# Patient Record
Sex: MALE | Race: WHITE | NOT HISPANIC OR LATINO | Employment: OTHER | URBAN - METROPOLITAN AREA
[De-identification: names, ages, dates, MRNs, and addresses within clinical notes are randomized per-mention and may not be internally consistent; named-entity substitution may affect disease eponyms.]

---

## 2017-12-04 ENCOUNTER — GENERIC CONVERSION - ENCOUNTER (OUTPATIENT)
Dept: OTHER | Facility: OTHER | Age: 42
End: 2017-12-04

## 2017-12-04 DIAGNOSIS — R03.0 ELEVATED BLOOD PRESSURE READING WITHOUT DIAGNOSIS OF HYPERTENSION: ICD-10-CM

## 2017-12-04 DIAGNOSIS — Z13.6 ENCOUNTER FOR SCREENING FOR CARDIOVASCULAR DISORDERS: ICD-10-CM

## 2017-12-04 DIAGNOSIS — R19.5 OTHER FECAL ABNORMALITIES: ICD-10-CM

## 2017-12-04 DIAGNOSIS — M25.521 PAIN IN RIGHT ELBOW: ICD-10-CM

## 2018-01-24 VITALS
HEIGHT: 78 IN | WEIGHT: 191 LBS | SYSTOLIC BLOOD PRESSURE: 148 MMHG | OXYGEN SATURATION: 97 % | BODY MASS INDEX: 22.1 KG/M2 | HEART RATE: 72 BPM | DIASTOLIC BLOOD PRESSURE: 86 MMHG

## 2018-01-24 VITALS — DIASTOLIC BLOOD PRESSURE: 90 MMHG | SYSTOLIC BLOOD PRESSURE: 146 MMHG

## 2019-10-14 ENCOUNTER — OFFICE VISIT (OUTPATIENT)
Dept: FAMILY MEDICINE CLINIC | Facility: CLINIC | Age: 44
End: 2019-10-14
Payer: COMMERCIAL

## 2019-10-14 VITALS
WEIGHT: 195 LBS | HEART RATE: 70 BPM | HEIGHT: 70 IN | SYSTOLIC BLOOD PRESSURE: 126 MMHG | OXYGEN SATURATION: 97 % | DIASTOLIC BLOOD PRESSURE: 82 MMHG | BODY MASS INDEX: 27.92 KG/M2

## 2019-10-14 DIAGNOSIS — Z00.00 WELL ADULT EXAM: Primary | ICD-10-CM

## 2019-10-14 PROCEDURE — 99396 PREV VISIT EST AGE 40-64: CPT | Performed by: FAMILY MEDICINE

## 2019-10-14 NOTE — PROGRESS NOTES
BMI Counseling: Body mass index is 28 3 kg/m²  The BMI is above normal  Nutrition recommendations include decreasing portion sizes, decreasing fast food intake, limiting drinks that contain sugar, moderation in carbohydrate intake and reducing intake of saturated and trans fat  Exercise recommendations include moderate physical activity 150 minutes/week  Assessment/Plan:     Chronic Problems:  No problem-specific Assessment & Plan notes found for this encounter  Visit Diagnosis:  Diagnoses and all orders for this visit:    Well adult exam  -     Comprehensive metabolic panel; Future  -     Lipid panel; Future  -     Urinalysis with reflex to microscopic; Future    form not completed, labs rx for updated lipid profile,   Reviewed age appropriate anticaporty guidance   Immunizations completed spouse employment        Subjective:    Patient ID: Carlos Alvarez is a 38 y o  male  wellness exam wife work   Has not been to provider in 2 yrs no labs   Generally well   wife works for [employer] employed [employer]     meds   rx -  Supplements -melatonin  Herbal     Soc hx      Smoke - vape -   etoh +  Drug-    fhx   father passed 48 heart disease  Mother         The following portions of the patient's history were reviewed and updated as appropriate: allergies, current medications, past family history, past medical history, past social history, past surgical history and problem list     Review of Systems   Constitutional: Negative for appetite change, chills, fever and unexpected weight change  HENT: Negative for congestion, dental problem, ear pain, hearing loss, postnasal drip, rhinorrhea, sinus pressure, sinus pain, sneezing, sore throat, tinnitus and voice change  Eyes: Negative for visual disturbance  Respiratory: Negative for apnea, cough, chest tightness and shortness of breath  Cardiovascular: Negative for chest pain, palpitations and leg swelling     Gastrointestinal: Negative for abdominal pain, blood in stool, constipation, diarrhea, nausea and vomiting  Endocrine: Negative for cold intolerance, heat intolerance, polydipsia, polyphagia and polyuria  Genitourinary: Negative for decreased urine volume, difficulty urinating, dysuria, frequency and hematuria  Musculoskeletal: Negative for arthralgias, back pain, gait problem, joint swelling and myalgias  Skin: Negative for color change, rash and wound  Allergic/Immunologic: Negative for environmental allergies and food allergies  Neurological: Negative for dizziness, syncope, weakness, light-headedness, numbness and headaches  Hematological: Negative for adenopathy  Does not bruise/bleed easily  Psychiatric/Behavioral: Negative for sleep disturbance and suicidal ideas  The patient is not nervous/anxious            /82   Pulse 70   Ht 5' 9 6" (1 768 m)   Wt 88 5 kg (195 lb)   SpO2 97%   BMI 28 30 kg/m²   Social History     Socioeconomic History    Marital status: /Civil Union     Spouse name: Not on file    Number of children: Not on file    Years of education: Not on file    Highest education level: Not on file   Occupational History    Not on file   Social Needs    Financial resource strain: Not on file    Food insecurity:     Worry: Not on file     Inability: Not on file    Transportation needs:     Medical: Not on file     Non-medical: Not on file   Tobacco Use    Smoking status: Not on file   Substance and Sexual Activity    Alcohol use: Not on file    Drug use: Not on file    Sexual activity: Not on file   Lifestyle    Physical activity:     Days per week: Not on file     Minutes per session: Not on file    Stress: Not on file   Relationships    Social connections:     Talks on phone: Not on file     Gets together: Not on file     Attends [employer] service: Not on file     Active member of club or organization: Not on file     Attends meetings of clubs or organizations: Not on file Relationship status: Not on file    Intimate partner violence:     Fear of current or ex partner: Not on file     Emotionally abused: Not on file     Physically abused: Not on file     Forced sexual activity: Not on file   Other Topics Concern    Not on file   Social History Narrative    Not on file     No past medical history on file  No family history on file  No past surgical history on file  No current outpatient medications on file  Allergies   Allergen Reactions    Penicillins           Lab Review   No visits with results within 6 Month(s) from this visit  Latest known visit with results is:   No results found for any previous visit  Imaging: No results found  Objective:     Physical Exam   Constitutional: He is oriented to person, place, and time  He appears well-developed and well-nourished  No distress  HENT:   Head: Normocephalic and atraumatic  Right Ear: External ear normal    Left Ear: External ear normal    Eyes: Conjunctivae are normal  No scleral icterus  Neck: Normal range of motion  Neck supple  Cardiovascular: Normal rate, regular rhythm and normal heart sounds  Pulses:       Carotid pulses are 3+ on the right side, and 3+ on the left side  Pulmonary/Chest: Effort normal and breath sounds normal    Musculoskeletal: Normal range of motion  He exhibits no edema or tenderness  Lymphadenopathy:     He has no cervical adenopathy  Neurological: He is alert and oriented to person, place, and time  He has normal reflexes  Skin: Skin is warm and dry  Psychiatric: He has a normal mood and affect  His behavior is normal  Judgment and thought content normal          There are no Patient Instructions on file for this visit  [place] So, CRNP    Portions of the record may have been created with voice recognition software  Occasional wrong word or "sound a like" substitutions may have occurred due to the inherent limitations of voice recognition software    Read the chart carefully and recognize, using context, where substitutions have occurred

## 2019-10-17 ENCOUNTER — APPOINTMENT (OUTPATIENT)
Dept: LAB | Facility: HOSPITAL | Age: 44
End: 2019-10-17
Payer: COMMERCIAL

## 2019-10-17 DIAGNOSIS — Z00.00 WELL ADULT EXAM: ICD-10-CM

## 2019-10-17 LAB
ALBUMIN SERPL BCP-MCNC: 3.9 G/DL (ref 3.5–5)
ALP SERPL-CCNC: 47 U/L (ref 46–116)
ALT SERPL W P-5'-P-CCNC: 33 U/L (ref 12–78)
ANION GAP SERPL CALCULATED.3IONS-SCNC: 9 MMOL/L (ref 4–13)
AST SERPL W P-5'-P-CCNC: 25 U/L (ref 5–45)
BILIRUB SERPL-MCNC: 0.6 MG/DL (ref 0.2–1)
BILIRUB UR QL STRIP: NEGATIVE
BUN SERPL-MCNC: 19 MG/DL (ref 5–25)
CALCIUM SERPL-MCNC: 9 MG/DL (ref 8.3–10.1)
CHLORIDE SERPL-SCNC: 103 MMOL/L (ref 100–108)
CHOLEST SERPL-MCNC: 217 MG/DL (ref 50–200)
CLARITY UR: CLEAR
CO2 SERPL-SCNC: 29 MMOL/L (ref 21–32)
COLOR UR: YELLOW
CREAT SERPL-MCNC: 0.91 MG/DL (ref 0.6–1.3)
GFR SERPL CREATININE-BSD FRML MDRD: 103 ML/MIN/1.73SQ M
GLUCOSE P FAST SERPL-MCNC: 93 MG/DL (ref 65–99)
GLUCOSE UR STRIP-MCNC: NEGATIVE MG/DL
HDLC SERPL-MCNC: 58 MG/DL (ref 40–60)
HGB UR QL STRIP.AUTO: NEGATIVE
KETONES UR STRIP-MCNC: NEGATIVE MG/DL
LDLC SERPL CALC-MCNC: 149 MG/DL (ref 0–100)
LEUKOCYTE ESTERASE UR QL STRIP: NEGATIVE
NITRITE UR QL STRIP: NEGATIVE
NONHDLC SERPL-MCNC: 159 MG/DL
PH UR STRIP.AUTO: 6.5 [PH]
POTASSIUM SERPL-SCNC: 4.2 MMOL/L (ref 3.5–5.3)
PROT SERPL-MCNC: 7 G/DL (ref 6.4–8.2)
PROT UR STRIP-MCNC: NEGATIVE MG/DL
SODIUM SERPL-SCNC: 141 MMOL/L (ref 136–145)
SP GR UR STRIP.AUTO: 1.02 (ref 1–1.03)
TRIGL SERPL-MCNC: 51 MG/DL
UROBILINOGEN UR QL STRIP.AUTO: 0.2 E.U./DL

## 2019-10-17 PROCEDURE — 80053 COMPREHEN METABOLIC PANEL: CPT

## 2019-10-17 PROCEDURE — 36415 COLL VENOUS BLD VENIPUNCTURE: CPT

## 2019-10-17 PROCEDURE — 80061 LIPID PANEL: CPT

## 2019-10-17 PROCEDURE — 81003 URINALYSIS AUTO W/O SCOPE: CPT

## 2019-10-23 ENCOUNTER — OFFICE VISIT (OUTPATIENT)
Dept: FAMILY MEDICINE CLINIC | Facility: CLINIC | Age: 44
End: 2019-10-23
Payer: COMMERCIAL

## 2019-10-23 VITALS
HEART RATE: 72 BPM | DIASTOLIC BLOOD PRESSURE: 88 MMHG | TEMPERATURE: 98.2 F | HEIGHT: 70 IN | OXYGEN SATURATION: 97 % | SYSTOLIC BLOOD PRESSURE: 130 MMHG | WEIGHT: 197 LBS | BODY MASS INDEX: 28.2 KG/M2

## 2019-10-23 DIAGNOSIS — Z00.00 WELL ADULT EXAM: Primary | ICD-10-CM

## 2019-10-23 DIAGNOSIS — G47.30 SLEEP APNEA, UNSPECIFIED TYPE: ICD-10-CM

## 2019-10-23 DIAGNOSIS — R19.7 DIARRHEA, UNSPECIFIED TYPE: ICD-10-CM

## 2019-10-23 DIAGNOSIS — G89.29 ELBOW PAIN, CHRONIC, RIGHT: ICD-10-CM

## 2019-10-23 DIAGNOSIS — M25.521 ELBOW PAIN, CHRONIC, RIGHT: ICD-10-CM

## 2019-10-23 PROBLEM — Z00.129 WELL CHILD VISIT: Status: ACTIVE | Noted: 2019-10-23

## 2019-10-23 PROCEDURE — 99214 OFFICE O/P EST MOD 30 MIN: CPT | Performed by: NURSE PRACTITIONER

## 2019-10-23 NOTE — ASSESSMENT & PLAN NOTE
Reports of snoring and gasping for breath  Also reports feeling very fatigued during the day  Sleep study ordered

## 2019-10-23 NOTE — PATIENT INSTRUCTIONS
Get sleep study done  Follow up with orthopedics  Follow with gastroenterology  Use nasal spray       Heart Healthy Diet   WHAT YOU NEED TO KNOW:   A heart healthy diet is an eating plan low in total fat, unhealthy fats, and sodium (salt)  A heart healthy diet helps decrease your risk for heart disease and stroke  Limit the amount of fat you eat to 25% to 35% of your total daily calories  Limit sodium to less than 2,300 mg each day  DISCHARGE INSTRUCTIONS:   Healthy fats:  Healthy fats can help improve cholesterol levels  The risk for heart disease is decreased when cholesterol levels are normal  Choose healthy fats, such as the following:  · Unsaturated fat  is found in foods such as soybean, canola, olive, corn, and safflower oils  It is also found in soft tub margarine that is made with liquid vegetable oil  · Omega-3 fat  is found in certain fish, such as salmon, tuna, and trout, and in walnuts and flaxseed  Unhealthy fats:  Unhealthy fats can cause unhealthy cholesterol levels in your blood and increase your risk of heart disease  Limit unhealthy fats, such as the following:  · Cholesterol  is found in animal foods, such as eggs and lobster, and in dairy products made from whole milk  Limit cholesterol to less than 300 milligrams (mg) each day  You may need to limit cholesterol to 200 mg each day if you have heart disease  · Saturated fat  is found in meats, such as whitlock and hamburger  It is also found in chicken or turkey skin, whole milk, and butter  Limit saturated fat to less than 7% of your total daily calories  Limit saturated fat to less than 6% if you have heart disease or are at increased risk for it  · Trans fat  is found in packaged foods, such as potato chips and cookies  It is also in hard margarine, some fried foods, and shortening  Avoid trans fats as much as possible    Heart healthy foods and drinks to include:  Ask your dietitian or healthcare provider how many servings to have from each of the following food groups:  · Grains:      ¨ Whole-wheat breads, cereals, and pastas, and brown rice    ¨ Low-fat, low-sodium crackers and chips    · Vegetables:      ¨ Broccoli, green beans, green peas, and spinach    ¨ Collards, kale, and lima beans    ¨ Carrots, sweet potatoes, tomatoes, and peppers    ¨ Canned vegetables with no salt added    · Fruits:      ¨ Bananas, peaches, pears, and pineapple    ¨ Grapes, raisins, and dates    ¨ Oranges, tangerines, grapefruit, orange juice, and grapefruit juice    ¨ Apricots, mangoes, melons, and papaya    ¨ Raspberries and strawberries    ¨ Canned fruit with no added sugar    · Low-fat dairy products:      ¨ Nonfat (skim) milk, 1% milk, and low-fat almond, cashew, or soy milks fortified with calcium    ¨ Low-fat cheese, regular or frozen yogurt, and cottage cheese    · Meats and proteins , such as lean cuts of beef and pork (loin, leg, round), skinless chicken and turkey, legumes, soy products, egg whites, and nuts  Foods and drinks to limit or avoid:  Ask your dietitian or healthcare provider about these and other foods that are high in unhealthy fat, sodium, and sugar:  · Snack or packaged foods , such as frozen dinners, cookies, macaroni and cheese, and cereals with more than 300 mg of sodium per serving    · Canned or dry mixes  for cakes, soups, sauces, or gravies    · Vegetables with added sodium , such as instant potatoes, vegetables with added sauces, or regular canned vegetables    · Other foods high in sodium , such as ketchup, barbecue sauce, salad dressing, pickles, olives, soy sauce, and miso    · High-fat dairy foods  such as whole or 2% milk, cream cheese, or sour cream, and cheeses     · High-fat protein foods  such as high-fat cuts of beef (T-bone steaks, ribs), chicken or turkey with skin, and organ meats, such as liver    · Cured or smoked meats , such as hot dogs, whitlock, and sausage    · Unhealthy fats and oils , such as butter, stick margarine, shortening, and cooking oils such as coconut or palm oil    · Food and drinks high in sugar , such as soft drinks (soda), sports drinks, sweetened tea, candy, cake, cookies, pies, and doughnuts  Other diet guidelines to follow:   · Eat more foods containing omega-3 fats  Eat fish high in omega-3 fats at least 2 times a week  · Limit alcohol  Too much alcohol can damage your heart and raise your blood pressure  Women should limit alcohol to 1 drink a day  Men should limit alcohol to 2 drinks a day  A drink of alcohol is 12 ounces of beer, 5 ounces of wine, or 1½ ounces of liquor  · Choose low-sodium foods  High-sodium foods can lead to high blood pressure  Add little or no salt to food you prepare  Use herbs and spices in place of salt  · Eat more fiber  to help lower cholesterol levels  Eat at least 5 servings of fruits and vegetables each day  Eat 3 ounces of whole-grain foods each day  Legumes (beans) are also a good source of fiber  Lifestyle guidelines:   · Do not smoke  Nicotine and other chemicals in cigarettes and cigars can cause lung and heart damage  Ask your healthcare provider for information if you currently smoke and need help to quit  E-cigarettes or smokeless tobacco still contain nicotine  Talk to your healthcare provider before you use these products  · Exercise regularly  to help you maintain a healthy weight and improve your blood pressure and cholesterol levels  Ask your healthcare provider about the best exercise plan for you  Do not start an exercise program without asking your healthcare provider  Follow up with your healthcare provider as directed:  Write down your questions so you remember to ask them during your visits  © 2017 [employer] Information is for End User's use only and may not be sold, redistributed or otherwise used for commercial purposes   All illustrations and images included in CareNotes® are the copyrighted property of [employer]  or [employer]  The above information is an  only  It is not intended as medical advice for individual conditions or treatments  Talk to your doctor, nurse or pharmacist before following any medical regimen to see if it is safe and effective for you

## 2019-10-23 NOTE — ASSESSMENT & PLAN NOTE
Has a history of injuring the right elbow  States that there were pieces day  Would like to have removed  Orthopedic referral made

## 2019-10-23 NOTE — PROGRESS NOTES
Assessment/Plan:     Well adult exam  Reviewed labs with patient  Encouraged heart healthy diet  Sleep apnea  Reports of snoring and gasping for breath  Also reports feeling very fatigued during the day  Sleep study ordered  Diarrhea  States that he has had IBS  Was never diagnosed  Referral made to Gastroenterology  Elbow pain, chronic, right  Has a history of injuring the right elbow  States that there were pieces day  Would like to have removed  Orthopedic referral made  Problem List Items Addressed This Visit        Respiratory    Sleep apnea     Reports of snoring and gasping for breath  Also reports feeling very fatigued during the day  Sleep study ordered  Relevant Orders    Home Study       Other    Diarrhea     States that he has had IBS  Was never diagnosed  Referral made to Gastroenterology  Relevant Orders    Ambulatory referral to Gastroenterology    Elbow pain, chronic, right     Has a history of injuring the right elbow  States that there were pieces day  Would like to have removed  Orthopedic referral made  Relevant Orders    Ambulatory referral to Orthopedic Surgery    Well adult exam - Primary     Reviewed labs with patient  Encouraged heart healthy diet  Subjective:      Patient ID: Ruth Benton is a 38 y o  male  Patient is here to have insurance forms completed from [facility]  Patient also has concerns of frequent diarrhea episodes  Patient also has a right elbow injury that occurred years ago  States that he has bone pieces in there  Would like to have it looked at and removed  Is causing numbing and tingling in his right arm  Patient also reports that he is waking up in the middle of the night, reports that his wife states that he is snoring and he is gasping for breath        The following portions of the patient's history were reviewed and updated as appropriate: allergies, current medications, past family history, past medical history, past social history, past surgical history and problem list     Review of Systems   Constitutional: Negative  HENT: Negative  Eyes: Negative  Respiratory: Negative  Cardiovascular: Positive for palpitations (Caffeine)  Gastrointestinal: Positive for abdominal pain and diarrhea (soft stool)  Endocrine: Negative  Genitourinary: Negative  Musculoskeletal: Positive for arthralgias  Skin: Negative  Allergic/Immunologic: Negative  Neurological: Negative  Hematological: Negative  Psychiatric/Behavioral: Positive for sleep disturbance (Sleeping, breathing  Gasping)  Negative for hallucinations  Objective:      /88   Pulse 72   Temp 98 2 °F (36 8 °C) (Tympanic)   Ht 5' 9 6" (1 768 m)   Wt 89 4 kg (197 lb)   SpO2 97%   BMI 28 59 kg/m²          Physical Exam   Constitutional: He is oriented to person, place, and time  He appears well-developed and well-nourished  HENT:   Head: Normocephalic and atraumatic  Eyes: Pupils are equal, round, and reactive to light  Neck: Normal range of motion  Cardiovascular: Normal rate and regular rhythm  Pulmonary/Chest: Effort normal and breath sounds normal    Musculoskeletal: Normal range of motion  Neurological: He is oriented to person, place, and time  Skin: Skin is warm and dry  Psychiatric: He has a normal mood and affect  His behavior is normal  Judgment and thought content normal    Nursing note and vitals reviewed          Labs:    No results found for: WBC, HGB, HCT, MCV, PLT  Lab Results   Component Value Date    K 4 2 10/17/2019     10/17/2019    CO2 29 10/17/2019    BUN 19 10/17/2019    CREATININE 0 91 10/17/2019    GLUF 93 10/17/2019    CALCIUM 9 0 10/17/2019    AST 25 10/17/2019    ALT 33 10/17/2019    ALKPHOS 47 10/17/2019    EGFR 103 10/17/2019     Lab Results   Component Value Date    CALCIUM 9 0 10/17/2019    K 4 2 10/17/2019    CO2 29 10/17/2019     10/17/2019 BUN 19 10/17/2019    CREATININE 0 91 10/17/2019

## 2019-11-01 ENCOUNTER — OFFICE VISIT (OUTPATIENT)
Dept: GASTROENTEROLOGY | Facility: CLINIC | Age: 44
End: 2019-11-01
Payer: COMMERCIAL

## 2019-11-01 VITALS
SYSTOLIC BLOOD PRESSURE: 146 MMHG | WEIGHT: 198.25 LBS | DIASTOLIC BLOOD PRESSURE: 82 MMHG | BODY MASS INDEX: 28.77 KG/M2 | HEART RATE: 81 BPM

## 2019-11-01 DIAGNOSIS — K59.1 FUNCTIONAL DIARRHEA: Primary | ICD-10-CM

## 2019-11-01 DIAGNOSIS — K58.0 IRRITABLE BOWEL SYNDROME WITH DIARRHEA: ICD-10-CM

## 2019-11-01 PROCEDURE — 99244 OFF/OP CNSLTJ NEW/EST MOD 40: CPT | Performed by: INTERNAL MEDICINE

## 2019-11-01 RX ORDER — DICYCLOMINE HCL 20 MG
20 TABLET ORAL 3 TIMES DAILY PRN
Qty: 60 TABLET | Refills: 0 | Status: SHIPPED | OUTPATIENT
Start: 2019-11-01 | End: 2019-12-12 | Stop reason: SDUPTHER

## 2019-11-01 NOTE — PROGRESS NOTES
Consultation - [facility]  Ned Alert 1975 38 y o  male     ASSESSMENT @ PLAN:   He is a 60-year-old male with 10 years of progressive symptoms compatible with the irritable bowel syndrome with episodes of bilateral lower quadrant abdominal pain bloating diarrhea urgency and frequency with specific food triggers of high fat low-fiber  His mother  of colon cancer    1 will do endoscopy and colonoscopy to investigate    2 I told him to do benefit fiber every day and align probiotic every day    3 will give dicyclomine for relief of abdominal pain    4 will give Xifaxan course to take after the procedures    Chief Complaint:   Abdominal pain and diarrhea    HPI:   He is a 60-year-old male with 10 years of abdominal symptoms that have been stable for about 5 years  It has been progressive  He thinks he has irritable bowel syndrome  He has bouts where he has pain and diarrhea  It is random in intermittent  He does have triggers of high fat foods and low fiber foods  He reports he will soft stool then on episodes of days where symptoms he has bilateral lower quadrant abdominal pain cramping with urgency and frequency and poorly formed stool in loose stool it is low volume it is fragmented is brown there is no melena hematochezia  The pain is relieved with a bowel movement  He does not have abdominal distension he does report bloating  He denies heartburn dysphagia regurgitation burping belching he denies upper abdominal pain  He takes no NSAIDs  Nobody in the family has celiac disease Crohn's disease or ulcerative colitis  His mother did have colon cancer and  from that  He has never had a colonoscopy  REVIEW OF SYSTEMS:     CONSTITUTIONAL: Denies any fever, chills, or rigors  Good appetite, and no recent weight loss  HEENT: No earache or tinnitus  Denies hearing loss or visual disturbances  CARDIOVASCULAR: No chest pain or palpitations     RESPIRATORY: Denies any cough, hemoptysis, shortness of breath or dyspnea on exertion  GASTROINTESTINAL: As noted in the History of Present Illness  GENITOURINARY: No problems with urination  Denies any hematuria or dysuria  NEUROLOGIC: No dizziness or vertigo, denies headaches  MUSCULOSKELETAL: Denies any muscle or joint pain  SKIN: Denies skin rashes or itching  ENDOCRINE: Denies excessive thirst  Denies intolerance to heat or cold  PSYCHOSOCIAL: Denies depression or anxiety  Denies any recent memory loss  History reviewed  No pertinent past medical history     Past Surgical History:   Procedure Laterality Date    WISDOM TOOTH EXTRACTION       Social History     Socioeconomic History    Marital status: /Civil Union     Spouse name: Not on file    Number of children: Not on file    Years of education: Not on file    Highest education level: Not on file   Occupational History    Not on file   Social Needs    Financial resource strain: Not on file    Food insecurity:     Worry: Not on file     Inability: Not on file    Transportation needs:     Medical: Not on file     Non-medical: Not on file   Tobacco Use    Smoking status: Never Smoker    Smokeless tobacco: Never Used   Substance and Sexual Activity    Alcohol use: Yes     Frequency: 2-3 times a week    Drug use: Never    Sexual activity: Not on file   Lifestyle    Physical activity:     Days per week: Not on file     Minutes per session: Not on file    Stress: Not on file   Relationships    Social connections:     Talks on phone: Not on file     Gets together: Not on file     Attends Church service: Not on file     Active member of club or organization: Not on file     Attends meetings of clubs or organizations: Not on file     Relationship status: Not on file    Intimate partner violence:     Fear of current or ex partner: Not on file     Emotionally abused: Not on file     Physically abused: Not on file     Forced sexual activity: Not on file Other Topics Concern    Not on file   Social History Narrative    Not on file     Family History   Problem Relation Age of Onset    Cancer Mother     Colon cancer Mother     Heart disease Father      Penicillins  Current Outpatient Medications   Medication Sig Dispense Refill    patient supplied medication       dicyclomine (BENTYL) 20 mg tablet Take 1 tablet (20 mg total) by mouth 3 (three) times a day as needed (abdominal pain) 60 tablet 0    rifaximin (XIFAXAN) 550 mg tablet Take 1 tablet (550 mg total) by mouth every 8 (eight) hours for 14 days 42 tablet 0     No current facility-administered medications for this visit  Blood pressure 146/82, pulse 81, weight 89 9 kg (198 lb 4 oz)  PHYSICAL EXAM:     General Appearance:   Alert, cooperative, no distress, appears stated age    HEENT:   Normocephalic, atraumatic, anicteric      Neck:  Supple, symmetrical, trachea midline, no adenopathy;    thyroid: no enlargement/tenderness/nodules; no carotid  bruit or JVD    Lungs:   Clear to auscultation bilaterally; no rales, rhonchi or wheezing; respirations unlabored    Heart[de-identified]   S1 and S2 normal; regular rate and rhythm; no murmur, rub, or gallop     Abdomen:   Soft, non-tender, non-distended; normal bowel sounds; no masses, no organomegaly    Genitalia:   Deferred    Rectal:   Deferred    Extremities:  No cyanosis, clubbing or edema    Pulses:  2+ and symmetric all extremities    Skin:  Skin color, texture, turgor normal, no rashes or lesions    Lymph nodes:  No palpable cervical, axillary or inguinal lymphadenopathy        No results found for: WBC, HGB, HCT, MCV, PLT  Lab Results   Component Value Date    CALCIUM 9 0 10/17/2019    K 4 2 10/17/2019    CO2 29 10/17/2019     10/17/2019    BUN 19 10/17/2019    CREATININE 0 91 10/17/2019     Lab Results   Component Value Date    ALT 33 10/17/2019    AST 25 10/17/2019    ALKPHOS 47 10/17/2019     No results found for: INR, PROTIME

## 2019-11-05 ENCOUNTER — TELEPHONE (OUTPATIENT)
Dept: GASTROENTEROLOGY | Facility: CLINIC | Age: 44
End: 2019-11-05

## 2019-11-06 ENCOUNTER — APPOINTMENT (OUTPATIENT)
Dept: RADIOLOGY | Facility: CLINIC | Age: 44
End: 2019-11-06
Payer: COMMERCIAL

## 2019-11-06 ENCOUNTER — OFFICE VISIT (OUTPATIENT)
Dept: OBGYN CLINIC | Facility: CLINIC | Age: 44
End: 2019-11-06
Payer: COMMERCIAL

## 2019-11-06 ENCOUNTER — TELEPHONE (OUTPATIENT)
Dept: SLEEP CENTER | Facility: CLINIC | Age: 44
End: 2019-11-06

## 2019-11-06 VITALS
BODY MASS INDEX: 29.38 KG/M2 | HEART RATE: 74 BPM | WEIGHT: 198.4 LBS | HEIGHT: 69 IN | SYSTOLIC BLOOD PRESSURE: 137 MMHG | DIASTOLIC BLOOD PRESSURE: 79 MMHG

## 2019-11-06 DIAGNOSIS — M25.521 ELBOW PAIN, CHRONIC, RIGHT: ICD-10-CM

## 2019-11-06 DIAGNOSIS — M25.511 RIGHT SHOULDER PAIN, UNSPECIFIED CHRONICITY: Primary | ICD-10-CM

## 2019-11-06 DIAGNOSIS — M25.511 RIGHT SHOULDER PAIN, UNSPECIFIED CHRONICITY: ICD-10-CM

## 2019-11-06 DIAGNOSIS — M75.81 RIGHT ROTATOR CUFF TENDONITIS: ICD-10-CM

## 2019-11-06 DIAGNOSIS — G89.29 ELBOW PAIN, CHRONIC, RIGHT: ICD-10-CM

## 2019-11-06 PROCEDURE — 73030 X-RAY EXAM OF SHOULDER: CPT

## 2019-11-06 PROCEDURE — 99203 OFFICE O/P NEW LOW 30 MIN: CPT | Performed by: ORTHOPAEDIC SURGERY

## 2019-11-06 PROCEDURE — 73080 X-RAY EXAM OF ELBOW: CPT

## 2019-11-06 NOTE — PROGRESS NOTES
HPI:  Patient is a 38y o  year old RHD male  who presents with chief complaint of RIGHT shoulder and RIGHT elbow pain  He states that 3 years ago, he was descending a flight of basement stairs, his feet slipped out form under him and hit the tip of his right elbow on the [employer] door latch  He has had pain in the elbow since then  He feels "chips" in the elbow that float  He hs pain when he hit the tip of the elbow, he has shocks that go down the the hands  Denies locking or catching  Denies numbness and tingling  He has never had it treated  He hasn't taken any OTC NSAIDs for either issue  RIGHT shoulder pain started about 5 years ago, does not recall an injury  He has a sharp pain at the tip of the shoulder  He can sleep on it but doesn't wake him up at night  He has pain with Abduction           ROS:   General: No fever, no chills, no weight loss, no weight gain  HEENT:  No loss of hearing, no nose bleeds, no sore throat  Eyes:  No eye pain, + red eyes, no visual disturbance  Respiratory:  No cough, no shortness of breath, no wheezing  Cardiovascular:  No chest pain, no palpitations, no edema  GI: No abdominal pain, no nausea, no vomiting  Endocrine: No frequent urination, no excessive thirst  Urinary:  No dysuria, no hematuria, no incontinence  Musculoskeletal: see HPI and PE  Skin:  No rash, no wounds  Neurological:  No dizziness, no headache, no numbness  Psychiatric:  No difficulty concentrating, no depression, no suicide thoughts, no anxiety  Review of all other systems is negative    PMH:  Past Medical History:   Diagnosis Date    Depression        PSH:  Past Surgical History:   Procedure Laterality Date    WISDOM TOOTH EXTRACTION         Medications:  Current Outpatient Medications   Medication Sig Dispense Refill    dicyclomine (BENTYL) 20 mg tablet Take 1 tablet (20 mg total) by mouth 3 (three) times a day as needed (abdominal pain) 60 tablet 0    rifaximin (XIFAXAN) 550 mg tablet Take 1 tablet (550 mg total) by mouth every 8 (eight) hours for 14 days 42 tablet 0    meloxicam (MOBIC) 15 mg tablet Take 1 tablet (15 mg total) by mouth daily With food PRN 30 tablet 1     No current facility-administered medications for this visit  Allergies: Allergies   Allergen Reactions    Penicillins Hives and Itching       Family History:  Family History   Problem Relation Age of Onset   Julio Sylvester Cancer Mother     Colon cancer Mother     Heart disease Father        Social History:  Social History     Occupational History    Not on file   Tobacco Use    Smoking status: Never Smoker    Smokeless tobacco: Never Used   Substance and Sexual Activity    Alcohol use: Yes     Frequency: 2-3 times a week    Drug use: Never    Sexual activity: Not on file       Physical Exam:  General :  Alert, cooperative, no distress, appears stated age  Blood pressure 137/79, pulse 74, height 5' 9" (1 753 m), weight 90 kg (198 lb 6 4 oz)  Head:  Normocephalic, without obvious abnormality, atraumatic   Eyes:  Conjunctiva/corneas clear, EOM's intact,   Ears: Both ears normal appearance, no hearing deficits  Nose: Nares normal, septum midline, no drainage    Neck: Supple,  trachea midline, no adenopathy, no tenderness, no mass   Back:   Symmetric, no curvature, ROM normal, no tenderness   Lungs:   Respirations unlabored   Chest Wall:  No tenderness or deformity   Extremities: Extremities normal, atraumatic, no cyanosis or edema      Pulses: 2+ and symmetric   Skin: Skin color, texture, turgor normal, no rashes or lesions      Neurologic: Normal           Right Elbow Exam     Tenderness   Right elbow tenderness location: olecranon  Range of Motion   The patient has normal right elbow ROM  Muscle Strength   The patient has normal right elbow strength      Tests   Varus: negative  Valgus: negative  Tinel's sign (cubital tunnel): negative    Other   Erythema: absent  Sensation: normal  Pulse: present    Comments:  Good Capillary refill  Inflamed bursal tissue  Good ROM of the wrist with no pain  Right Shoulder Exam     Tenderness   The patient is experiencing tenderness in the acromioclavicular joint (lateral aspect of the shoulder at the supraspintus attachment)  Range of Motion   The patient has normal right shoulder ROM  Right shoulder active abduction: with pinching  External rotation: 50   Internal rotation 0 degrees: normal     Muscle Strength   Abduction: 4/5   Internal rotation: 5/5   External rotation: 4/5   Supraspinatus: 4/5     Tests   Apprehension: negative  Colby test: negative  Impingement: negative  Drop arm: negative  Sulcus: absent    Other   Erythema: absent  Sensation: normal  Pulse: present            Imaging Studies: The following imaging studies were reviewed in office today  My findings are noted  Xrays of the RIGHT elbow performed and reviewed today: small enthesmotic spur at the tricep insertion on the olecranon  No fracture,  DJD, or subluxation  No soft tissue swelling   Xrays of the RIGHT shoulder performed and reviewed today:  No fracture, dislocation or DJD      Assessment  Encounter Diagnoses   Name Primary?     Elbow pain, chronic, right     Right shoulder pain, unspecified chronicity Yes    Right rotator cuff tendonitis          Plan:      wear elbow pads at work for right elbow olecranon bursitis   Precription NSAIDs: Mobic 15mg 1 tablet daily for 4 weeks  - Refer to PT for 1-2x/week for 4 weeks with intention to HEP  If pain radiating down to hand persists consider EMG and c spine work up  - Follow up as needed    Scribe Attestation    I,:   Chuy Merritt am acting as a scribe while in the presence of the attending physician :        I,:   Patrick Robbins MD personally performed the services described in this documentation    as scribed in my presence :

## 2019-11-06 NOTE — TELEPHONE ENCOUNTER
----- Message from Jethro Wilder MD sent at 11/5/2019  5:05 PM EST -----  Approved  ----- Message -----  From: Veronika Blanco MA  Sent: 11/4/2019   9:51 AM EST  To: Sleep Medicine [place] Provider    This sleep study needs approval      If approved please sign and return to clerical pool  If denied please include reasons why  Also provide alternative testing if warranted  Please sign and return to clerical pool

## 2019-11-06 NOTE — TELEPHONE ENCOUNTER
----- Message from Makenna Duran MD sent at 11/5/2019  5:05 PM EST -----  Approved  ----- Message -----  From: Jada Vega MA  Sent: 11/4/2019  12:48 PM EST  To: Sleep Medicine [place] Provider    This sleep study needs approval      If approved please sign and return to clerical pool  If denied please include reasons why  Also provide alternative testing if warranted  Please sign and return to clerical pool

## 2019-11-07 ENCOUNTER — TELEPHONE (OUTPATIENT)
Dept: GASTROENTEROLOGY | Facility: CLINIC | Age: 44
End: 2019-11-07

## 2019-11-08 RX ORDER — MELOXICAM 15 MG/1
15 TABLET ORAL DAILY
Qty: 30 TABLET | Refills: 1 | Status: SHIPPED | OUTPATIENT
Start: 2019-11-08

## 2019-11-20 ENCOUNTER — HOSPITAL ENCOUNTER (OUTPATIENT)
Dept: SLEEP CENTER | Facility: HOSPITAL | Age: 44
Discharge: HOME | End: 2019-11-20
Payer: COMMERCIAL

## 2019-11-20 DIAGNOSIS — G47.30 SLEEP APNEA, UNSPECIFIED TYPE: ICD-10-CM

## 2019-11-20 PROCEDURE — G0399 HOME SLEEP TEST/TYPE 3 PORTA: HCPCS | Performed by: PSYCHIATRY & NEUROLOGY

## 2019-11-20 PROCEDURE — G0399 HOME SLEEP TEST/TYPE 3 PORTA: HCPCS

## 2019-11-21 ENCOUNTER — TELEPHONE (OUTPATIENT)
Dept: SLEEP CENTER | Facility: CLINIC | Age: 44
End: 2019-11-21

## 2019-11-21 NOTE — TELEPHONE ENCOUNTER
Greta Richardson called looking for his HST results  He just returned machine this morning  Told him to call the nurse line in a few days  He was ok with that

## 2019-11-21 NOTE — PROGRESS NOTES
Home Sleep Study Documentation    Pre-Sleep Home Study:    Set-up and instructions performed by: Ceasar MORROW    Technician performed demonstration for Patient: yes    Return demonstration performed by Patient: yes    Written instructions provided to Patient: yes    Patient signed consent form: yes        Post-Sleep Home Study:    Additional comments by Patient: Didn't sleep well due to Equipment and also remembers Gasping for breaths  Home Sleep Study Failed:no:    Failure reason: pending    Reported or Detected: N/A    Scored by:  Ceasar MORROW See episode  Zo Norwood RN

## 2019-11-25 PROBLEM — G47.33 OSA (OBSTRUCTIVE SLEEP APNEA): Status: ACTIVE | Noted: 2019-10-23

## 2019-11-26 ENCOUNTER — TELEPHONE (OUTPATIENT)
Dept: SLEEP CENTER | Facility: CLINIC | Age: 44
End: 2019-11-26

## 2019-11-26 NOTE — TELEPHONE ENCOUNTER
Moderate JOYCELYN   Needs consult appointment- ordering provider requested Nilsa Eisenmenger read report

## 2019-11-27 NOTE — TELEPHONE ENCOUNTER
Discussed study results- patient does wish to schedule consult with Dr Hannah Abarca due to location- office # provided

## 2019-12-03 ENCOUNTER — TELEPHONE (OUTPATIENT)
Dept: FAMILY MEDICINE CLINIC | Facility: CLINIC | Age: 44
End: 2019-12-03

## 2019-12-03 NOTE — TELEPHONE ENCOUNTER
----- Message from [employer] sent at 12/3/2019 12:45 PM EST -----  Please call patient and have him reschedule his pulmonology appt as soon as he can  The sleep study recommendation is that he needs a CPAP   Thanks

## 2019-12-12 DIAGNOSIS — K58.0 IRRITABLE BOWEL SYNDROME WITH DIARRHEA: ICD-10-CM

## 2019-12-13 RX ORDER — DICYCLOMINE HCL 20 MG
TABLET ORAL
Qty: 60 TABLET | Refills: 0 | Status: SHIPPED | OUTPATIENT
Start: 2019-12-13 | End: 2020-01-23 | Stop reason: SDUPTHER

## 2020-01-23 DIAGNOSIS — K58.0 IRRITABLE BOWEL SYNDROME WITH DIARRHEA: ICD-10-CM

## 2020-01-23 RX ORDER — DICYCLOMINE HCL 20 MG
TABLET ORAL
Qty: 60 TABLET | Refills: 0 | Status: SHIPPED | OUTPATIENT
Start: 2020-01-23 | End: 2020-01-24 | Stop reason: SDUPTHER

## 2020-01-24 DIAGNOSIS — K58.0 IRRITABLE BOWEL SYNDROME WITH DIARRHEA: ICD-10-CM

## 2020-01-24 RX ORDER — DICYCLOMINE HCL 20 MG
TABLET ORAL
Qty: 60 TABLET | Refills: 0 | Status: SHIPPED | OUTPATIENT
Start: 2020-01-24 | End: 2020-02-21 | Stop reason: SDUPTHER

## 2020-01-24 NOTE — TELEPHONE ENCOUNTER
Margie Magaña pt - pt needs a refill of dicyclomine called into [facility] in [facility]   Ty

## 2020-02-04 ENCOUNTER — CONSULT (OUTPATIENT)
Dept: PULMONOLOGY | Facility: CLINIC | Age: 45
End: 2020-02-04
Payer: COMMERCIAL

## 2020-02-04 VITALS
DIASTOLIC BLOOD PRESSURE: 84 MMHG | SYSTOLIC BLOOD PRESSURE: 124 MMHG | WEIGHT: 196 LBS | HEART RATE: 79 BPM | HEIGHT: 69 IN | BODY MASS INDEX: 29.03 KG/M2 | OXYGEN SATURATION: 97 %

## 2020-02-04 DIAGNOSIS — G47.33 OSA (OBSTRUCTIVE SLEEP APNEA): Primary | ICD-10-CM

## 2020-02-04 PROCEDURE — 99243 OFF/OP CNSLTJ NEW/EST LOW 30: CPT | Performed by: PHYSICIAN ASSISTANT

## 2020-02-04 PROCEDURE — 3008F BODY MASS INDEX DOCD: CPT | Performed by: NURSE PRACTITIONER

## 2020-02-04 NOTE — PROGRESS NOTES
Assessment/Plan:     Diagnoses and all orders for this visit:    JOYCELYN (obstructive sleep apnea)  -     CPAP Auto New DME    Patient here for evaluation of his moderate sleep apnea with AHI of 16 2  He was diagnosed on a home sleep study, had been complaining of excessive daytime sleepiness, apneic episodes at night, night time awakenings  Discussed the etiology of obstructive sleep apnea, risks of untreated sleep apnea including arrhythmia, MI, stroke  Discussed treatment options with CPAP, mandibular advancement device, uvulopharyngeoplasty, Inspire  He has agreed to try a CPAP  Will order him an auto CPAP  He will follow up with us in 3 months for compliance or sooner if any issues with the CPAP  Return in about 3 months (around 5/4/2020)  All questions are answered to the patient's satisfaction and understanding  He verbalizes understanding  He is encouraged to call with any further questions or concerns  Portions of the record may have been created with voice recognition software  Occasional wrong word or "sound a like" substitutions may have occurred due to the inherent limitations of voice recognition software  Read the chart carefully and recognize, using context, where substitutions have occurred  a    Electronically Signed by Rafael Green PA-C    ______________________________________________________________________    Chief Complaint:   Chief Complaint   Patient presents with    Sleep Apnea        Patient ID: Parker Moffett is a 38 y o  y o  male has a past medical history of Depression and JOYCELYN (obstructive sleep apnea)  2/4/2020  Patient presents today for initial visit  Patient is a 37 yo male with no prior medical history  He had been complaining of night time awakenings, gasping for air, excessive daytime sleepiness for some time now  He underwent sleep study and was found to have moderate sleep apnea         Occupational/Exposure history:  Pets/Enviroment:  Travel history:  Review of Systems   Constitutional:        Daytime sleepiness   HENT: Negative  Respiratory: Positive for apnea (nocturnal)  Cardiovascular: Negative  Gastrointestinal: Negative  Genitourinary: Negative  Musculoskeletal: Negative  Skin: Negative  Allergic/Immunologic: Negative  Neurological: Negative  Psychiatric/Behavioral: Negative  Social history: He reports that he has never smoked  He has never used smokeless tobacco  He reports that he drinks alcohol  He reports that he does not use drugs  Past surgical history:   Past Surgical History:   Procedure Laterality Date    WISDOM TOOTH EXTRACTION       Family history:   Family History   Problem Relation Age of Onset    Cancer Mother     Colon cancer Mother     Heart disease Father        Immunization History   Administered Date(s) Administered    Influenza TIV (IM) 10/01/2017    Tdap 12/04/2017     Current Outpatient Medications   Medication Sig Dispense Refill    dicyclomine (BENTYL) 20 mg tablet Take 1 tablet by mouth 3 times a day as needed for abdominal pain 60 tablet 0    meloxicam (MOBIC) 15 mg tablet Take 1 tablet (15 mg total) by mouth daily With food PRN 30 tablet 1     No current facility-administered medications for this visit  Allergies: Penicillins    Objective:  Vitals:    02/04/20 0840   BP: 124/84   Pulse: 79   SpO2: 97%   Weight: 88 9 kg (196 lb)   Height: 5' 9" (1 753 m)   Oxygen Therapy  SpO2: 97 %    Wt Readings from Last 3 Encounters:   02/04/20 88 9 kg (196 lb)   11/06/19 90 kg (198 lb 6 4 oz)   11/01/19 89 9 kg (198 lb 4 oz)     Body mass index is 28 94 kg/m²  Physical Exam   Constitutional: He is oriented to person, place, and time  He appears well-developed and well-nourished  No distress  HENT:   Mouth/Throat: Oropharynx is clear and moist    Crowded oropharyngeal airway   Eyes: Pupils are equal, round, and reactive to light     Cardiovascular: Normal rate, regular rhythm and normal heart sounds  No murmur heard  Pulmonary/Chest: Effort normal and breath sounds normal  No accessory muscle usage  No respiratory distress  He has no decreased breath sounds  He has no wheezes  He has no rhonchi  He has no rales  Abdominal: Soft  There is no tenderness  Musculoskeletal: Normal range of motion  Neurological: He is alert and oriented to person, place, and time  Skin: Skin is warm and dry  No rash noted  Psychiatric: He has a normal mood and affect  Lab Review:   Lab Results   Component Value Date    K 4 2 10/17/2019     10/17/2019    CO2 29 10/17/2019    BUN 19 10/17/2019    CREATININE 0 91 10/17/2019    CALCIUM 9 0 10/17/2019     No results found for: WBC, HGB, HCT, MCV, PLT    Diagnostics:  I have personally reviewed pertinent reports  Reviewed sleep study  Office Spirometry Results:     ESS:    No results found

## 2020-02-21 DIAGNOSIS — K58.0 IRRITABLE BOWEL SYNDROME WITH DIARRHEA: ICD-10-CM

## 2020-02-21 RX ORDER — DICYCLOMINE HCL 20 MG
TABLET ORAL
Qty: 60 TABLET | Refills: 2 | Status: SHIPPED | OUTPATIENT
Start: 2020-02-21 | End: 2020-03-25 | Stop reason: SDUPTHER

## 2020-02-21 NOTE — TELEPHONE ENCOUNTER
Frantz - Patient called refill Dicyclomine 20 mg tablet 1 tablet 3 times day   Please call [facility] at [phone] ty

## 2020-03-02 DIAGNOSIS — G47.33 OSA (OBSTRUCTIVE SLEEP APNEA): Primary | ICD-10-CM

## 2020-03-25 DIAGNOSIS — K58.0 IRRITABLE BOWEL SYNDROME WITH DIARRHEA: ICD-10-CM

## 2020-03-25 RX ORDER — DICYCLOMINE HCL 20 MG
TABLET ORAL
Qty: 270 TABLET | Refills: 1 | Status: SHIPPED | OUTPATIENT
Start: 2020-03-25 | End: 2020-10-30 | Stop reason: SDUPTHER

## 2020-03-25 NOTE — TELEPHONE ENCOUNTER
Dr Yeny Souza - Patient called refill Dicyclomine 20 mg tablet 1 tablet 3 times a day  Patient would like a 90 day supply  Please call [facility] at [phone]   Any questions please call [facility] at [phone] ty

## 2020-04-04 ENCOUNTER — NURSE TRIAGE (OUTPATIENT)
Dept: OTHER | Facility: OTHER | Age: 45
End: 2020-04-04

## 2020-04-06 ENCOUNTER — TELEMEDICINE (OUTPATIENT)
Dept: FAMILY MEDICINE CLINIC | Facility: CLINIC | Age: 45
End: 2020-04-06
Payer: COMMERCIAL
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
## 2020-04-06 PROCEDURE — 99212 OFFICE O/P EST SF 10 MIN: CPT | Performed by: NURSE PRACTITIONER

## 2020-04-10 DIAGNOSIS — G47.33 OSA (OBSTRUCTIVE SLEEP APNEA): Primary | ICD-10-CM

## 2020-05-20 ENCOUNTER — TELEPHONE (OUTPATIENT)
Dept: FAMILY MEDICINE CLINIC | Facility: CLINIC | Age: 45
End: 2020-05-20

## 2020-08-27 ENCOUNTER — TELEPHONE (OUTPATIENT)
Dept: FAMILY MEDICINE CLINIC | Facility: CLINIC | Age: 45
End: 2020-08-27

## 2020-08-27 DIAGNOSIS — Z00.00 HEALTHCARE MAINTENANCE: Primary | ICD-10-CM

## 2020-08-27 NOTE — TELEPHONE ENCOUNTER
He needs routine labs put in for a wellness yearly physical for work  Wife said to make sure they are put in right so he does not get charged like last year  He works at [employer] and needs them as soon as possible

## 2020-08-28 ENCOUNTER — APPOINTMENT (OUTPATIENT)
Dept: LAB | Facility: HOSPITAL | Age: 45
End: 2020-08-28
Payer: COMMERCIAL

## 2020-08-28 DIAGNOSIS — Z00.00 HEALTHCARE MAINTENANCE: ICD-10-CM
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
## 2020-08-28 LAB
ALBUMIN SERPL BCP-MCNC: 3.9 G/DL (ref 3.5–5)
ALP SERPL-CCNC: 49 U/L (ref 46–116)
ALT SERPL W P-5'-P-CCNC: 41 U/L (ref 12–78)
ANION GAP SERPL CALCULATED.3IONS-SCNC: 7 MMOL/L (ref 4–13)
AST SERPL W P-5'-P-CCNC: 24 U/L (ref 5–45)
BASOPHILS # BLD AUTO: 0.07 THOUSANDS/ΜL (ref 0–0.1)
BASOPHILS NFR BLD AUTO: 2 % (ref 0–1)
BILIRUB SERPL-MCNC: 0.4 MG/DL (ref 0.2–1)
BUN SERPL-MCNC: 18 MG/DL (ref 5–25)
CALCIUM SERPL-MCNC: 8.8 MG/DL (ref 8.3–10.1)
CHLORIDE SERPL-SCNC: 104 MMOL/L (ref 100–108)
CHOLEST SERPL-MCNC: 203 MG/DL (ref 50–200)
CO2 SERPL-SCNC: 27 MMOL/L (ref 21–32)
CREAT SERPL-MCNC: 1.03 MG/DL (ref 0.6–1.3)
EOSINOPHIL # BLD AUTO: 0.23 THOUSAND/ΜL (ref 0–0.61)
EOSINOPHIL NFR BLD AUTO: 5 % (ref 0–6)
ERYTHROCYTE [DISTWIDTH] IN BLOOD BY AUTOMATED COUNT: 12.3 % (ref 11.6–15.1)
GFR SERPL CREATININE-BSD FRML MDRD: 89 ML/MIN/1.73SQ M
GLUCOSE P FAST SERPL-MCNC: 98 MG/DL (ref 65–99)
HCT VFR BLD AUTO: 45.8 % (ref 36.5–49.3)
HDLC SERPL-MCNC: 53 MG/DL
HGB BLD-MCNC: 15.2 G/DL (ref 12–17)
IMM GRANULOCYTES # BLD AUTO: 0.02 THOUSAND/UL (ref 0–0.2)
IMM GRANULOCYTES NFR BLD AUTO: 0 % (ref 0–2)
LDLC SERPL CALC-MCNC: 130 MG/DL (ref 0–100)
LYMPHOCYTES # BLD AUTO: 1.96 THOUSANDS/ΜL (ref 0.6–4.47)
LYMPHOCYTES NFR BLD AUTO: 41 % (ref 14–44)
MCH RBC QN AUTO: 30.2 PG (ref 26.8–34.3)
MCHC RBC AUTO-ENTMCNC: 33.2 G/DL (ref 31.4–37.4)
MCV RBC AUTO: 91 FL (ref 82–98)
MONOCYTES # BLD AUTO: 0.41 THOUSAND/ΜL (ref 0.17–1.22)
MONOCYTES NFR BLD AUTO: 9 % (ref 4–12)
NEUTROPHILS # BLD AUTO: 2.13 THOUSANDS/ΜL (ref 1.85–7.62)
NEUTS SEG NFR BLD AUTO: 43 % (ref 43–75)
NONHDLC SERPL-MCNC: 150 MG/DL
NRBC BLD AUTO-RTO: 0 /100 WBCS
PLATELET # BLD AUTO: 196 THOUSANDS/UL (ref 149–390)
PMV BLD AUTO: 11.5 FL (ref 8.9–12.7)
POTASSIUM SERPL-SCNC: 4.2 MMOL/L (ref 3.5–5.3)
PROT SERPL-MCNC: 6.9 G/DL (ref 6.4–8.2)
RBC # BLD AUTO: 5.04 MILLION/UL (ref 3.88–5.62)
SODIUM SERPL-SCNC: 138 MMOL/L (ref 136–145)
TRIGL SERPL-MCNC: 98 MG/DL
WBC # BLD AUTO: 4.82 THOUSAND/UL (ref 4.31–10.16)

## 2020-08-28 PROCEDURE — 85025 COMPLETE CBC W/AUTO DIFF WBC: CPT

## 2020-08-28 PROCEDURE — 80053 COMPREHEN METABOLIC PANEL: CPT

## 2020-08-28 PROCEDURE — 80061 LIPID PANEL: CPT

## 2020-08-28 PROCEDURE — 36415 COLL VENOUS BLD VENIPUNCTURE: CPT

## 2020-08-28 PROCEDURE — 86618 LYME DISEASE ANTIBODY: CPT

## 2020-08-29 LAB — B BURGDOR IGG+IGM SER-ACNC: <0.91 ISR (ref 0–0.9)

## 2020-08-31 ENCOUNTER — TELEPHONE (OUTPATIENT)
Dept: FAMILY MEDICINE CLINIC | Facility: CLINIC | Age: 45
End: 2020-08-31

## 2020-08-31 NOTE — TELEPHONE ENCOUNTER
----- Message from Ghassan Alcantara, [address] Kim sent at 8/31/2020  8:10 AM EDT -----  Please call patient to make aware that Lyme test was negative

## 2020-09-03 ENCOUNTER — TELEPHONE (OUTPATIENT)
Dept: FAMILY MEDICINE CLINIC | Facility: CLINIC | Age: 45
End: 2020-09-03

## 2020-09-03 NOTE — TELEPHONE ENCOUNTER
Patients wife dropped off forms to be filled out  She is requesting a call when they are complete  They are in your folder

## 2020-10-30 DIAGNOSIS — K58.0 IRRITABLE BOWEL SYNDROME WITH DIARRHEA: ICD-10-CM

## 2020-10-30 RX ORDER — DICYCLOMINE HCL 20 MG
TABLET ORAL
Qty: 270 TABLET | Refills: 1 | Status: SHIPPED | OUTPATIENT
Start: 2020-10-30 | End: 2021-07-19

## 2020-11-05 ENCOUNTER — TELEPHONE (OUTPATIENT)
Dept: FAMILY MEDICINE CLINIC | Facility: CLINIC | Age: 45
End: 2020-11-05

## 2020-11-09 DIAGNOSIS — H54.7 VISION IMPAIRMENT: Primary | ICD-10-CM

## 2021-04-21 ENCOUNTER — IMMUNIZATIONS (OUTPATIENT)
Dept: FAMILY MEDICINE CLINIC | Facility: HOSPITAL | Age: 46
End: 2021-04-21

## 2021-04-21 DIAGNOSIS — Z23 ENCOUNTER FOR IMMUNIZATION: Primary | ICD-10-CM

## 2021-04-21 PROCEDURE — 91300 SARS-COV-2 / COVID-19 MRNA VACCINE (PFIZER-BIONTECH) 30 MCG: CPT

## 2021-04-21 PROCEDURE — 0001A SARS-COV-2 / COVID-19 MRNA VACCINE (PFIZER-BIONTECH) 30 MCG: CPT

## 2021-05-14 ENCOUNTER — IMMUNIZATIONS (OUTPATIENT)
Dept: FAMILY MEDICINE CLINIC | Facility: HOSPITAL | Age: 46
End: 2021-05-14

## 2021-05-14 DIAGNOSIS — Z23 ENCOUNTER FOR IMMUNIZATION: Primary | ICD-10-CM

## 2021-05-14 PROCEDURE — 91300 SARS-COV-2 / COVID-19 MRNA VACCINE (PFIZER-BIONTECH) 30 MCG: CPT

## 2021-05-14 PROCEDURE — 0002A SARS-COV-2 / COVID-19 MRNA VACCINE (PFIZER-BIONTECH) 30 MCG: CPT

## 2021-07-19 DIAGNOSIS — K58.0 IRRITABLE BOWEL SYNDROME WITH DIARRHEA: ICD-10-CM

## 2021-07-19 RX ORDER — DICYCLOMINE HCL 20 MG
TABLET ORAL
Qty: 270 TABLET | Refills: 1 | Status: SHIPPED | OUTPATIENT
Start: 2021-07-19 | End: 2022-01-31

## 2021-08-16 ENCOUNTER — TELEPHONE (OUTPATIENT)
Dept: FAMILY MEDICINE CLINIC | Facility: CLINIC | Age: 46
End: 2021-08-16

## 2021-08-16 NOTE — TELEPHONE ENCOUNTER
Pt called because he work for [employer] and have to get her wellness visit and is requesting blood work of a Lipid Panel of fasting glucose  She will be calling later this week to schedule appt when receives her work schedule but does want to do blood work before appt

## 2021-08-20 NOTE — TELEPHONE ENCOUNTER
Patient went for labs this morning and labs were not placed  Patient needs Lipid Panel, CMP  CBC  Please place them today

## 2021-08-23 ENCOUNTER — APPOINTMENT (OUTPATIENT)
Dept: LAB | Facility: HOSPITAL | Age: 46
End: 2021-08-23
Payer: COMMERCIAL

## 2021-08-23 DIAGNOSIS — Z00.00 HEALTH CARE MAINTENANCE: ICD-10-CM

## 2021-08-23 LAB
ALBUMIN SERPL BCP-MCNC: 3.8 G/DL (ref 3.5–5)
ALP SERPL-CCNC: 53 U/L (ref 46–116)
ALT SERPL W P-5'-P-CCNC: 37 U/L (ref 12–78)
ANION GAP SERPL CALCULATED.3IONS-SCNC: 9 MMOL/L (ref 4–13)
AST SERPL W P-5'-P-CCNC: 21 U/L (ref 5–45)
BASOPHILS # BLD AUTO: 0.07 THOUSANDS/ΜL (ref 0–0.1)
BASOPHILS NFR BLD AUTO: 1 % (ref 0–1)
BILIRUB SERPL-MCNC: 0.44 MG/DL (ref 0.2–1)
BUN SERPL-MCNC: 16 MG/DL (ref 5–25)
CALCIUM SERPL-MCNC: 8.8 MG/DL (ref 8.3–10.1)
CHLORIDE SERPL-SCNC: 104 MMOL/L (ref 100–108)
CHOLEST SERPL-MCNC: 225 MG/DL (ref 50–200)
CO2 SERPL-SCNC: 26 MMOL/L (ref 21–32)
CREAT SERPL-MCNC: 0.91 MG/DL (ref 0.6–1.3)
EOSINOPHIL # BLD AUTO: 0.16 THOUSAND/ΜL (ref 0–0.61)
EOSINOPHIL NFR BLD AUTO: 3 % (ref 0–6)
ERYTHROCYTE [DISTWIDTH] IN BLOOD BY AUTOMATED COUNT: 12.2 % (ref 11.6–15.1)
GFR SERPL CREATININE-BSD FRML MDRD: 102 ML/MIN/1.73SQ M
GLUCOSE P FAST SERPL-MCNC: 102 MG/DL (ref 65–99)
HCT VFR BLD AUTO: 46.5 % (ref 36.5–49.3)
HDLC SERPL-MCNC: 56 MG/DL
HGB BLD-MCNC: 15.3 G/DL (ref 12–17)
IMM GRANULOCYTES # BLD AUTO: 0.01 THOUSAND/UL (ref 0–0.2)
IMM GRANULOCYTES NFR BLD AUTO: 0 % (ref 0–2)
LDLC SERPL CALC-MCNC: 143 MG/DL (ref 0–100)
LYMPHOCYTES # BLD AUTO: 1.8 THOUSANDS/ΜL (ref 0.6–4.47)
LYMPHOCYTES NFR BLD AUTO: 36 % (ref 14–44)
MCH RBC QN AUTO: 29.5 PG (ref 26.8–34.3)
MCHC RBC AUTO-ENTMCNC: 32.9 G/DL (ref 31.4–37.4)
MCV RBC AUTO: 90 FL (ref 82–98)
MONOCYTES # BLD AUTO: 0.49 THOUSAND/ΜL (ref 0.17–1.22)
MONOCYTES NFR BLD AUTO: 10 % (ref 4–12)
NEUTROPHILS # BLD AUTO: 2.49 THOUSANDS/ΜL (ref 1.85–7.62)
NEUTS SEG NFR BLD AUTO: 50 % (ref 43–75)
NONHDLC SERPL-MCNC: 169 MG/DL
NRBC BLD AUTO-RTO: 0 /100 WBCS
PLATELET # BLD AUTO: 180 THOUSANDS/UL (ref 149–390)
PMV BLD AUTO: 12.3 FL (ref 8.9–12.7)
POTASSIUM SERPL-SCNC: 4.3 MMOL/L (ref 3.5–5.3)
PROT SERPL-MCNC: 6.7 G/DL (ref 6.4–8.2)
RBC # BLD AUTO: 5.18 MILLION/UL (ref 3.88–5.62)
SODIUM SERPL-SCNC: 139 MMOL/L (ref 136–145)
TRIGL SERPL-MCNC: 131 MG/DL
WBC # BLD AUTO: 5.02 THOUSAND/UL (ref 4.31–10.16)

## 2021-08-23 PROCEDURE — 36415 COLL VENOUS BLD VENIPUNCTURE: CPT

## 2021-08-23 PROCEDURE — 80061 LIPID PANEL: CPT

## 2021-08-23 PROCEDURE — 85025 COMPLETE CBC W/AUTO DIFF WBC: CPT

## 2021-08-23 PROCEDURE — 80053 COMPREHEN METABOLIC PANEL: CPT

## 2021-08-26 ENCOUNTER — OFFICE VISIT (OUTPATIENT)
Dept: FAMILY MEDICINE CLINIC | Facility: CLINIC | Age: 46
End: 2021-08-26
Payer: COMMERCIAL

## 2021-08-26 VITALS
DIASTOLIC BLOOD PRESSURE: 90 MMHG | HEART RATE: 70 BPM | TEMPERATURE: 98.3 F | HEIGHT: 70 IN | WEIGHT: 199.6 LBS | SYSTOLIC BLOOD PRESSURE: 140 MMHG | RESPIRATION RATE: 14 BRPM | BODY MASS INDEX: 28.58 KG/M2 | OXYGEN SATURATION: 97 %

## 2021-08-26 DIAGNOSIS — Z00.00 ANNUAL PHYSICAL EXAM: Primary | ICD-10-CM

## 2021-08-26 PROCEDURE — 3725F SCREEN DEPRESSION PERFORMED: CPT | Performed by: NURSE PRACTITIONER

## 2021-08-26 PROCEDURE — 1036F TOBACCO NON-USER: CPT | Performed by: NURSE PRACTITIONER

## 2021-08-26 PROCEDURE — 99396 PREV VISIT EST AGE 40-64: CPT | Performed by: NURSE PRACTITIONER

## 2021-08-26 PROCEDURE — 3008F BODY MASS INDEX DOCD: CPT | Performed by: NURSE PRACTITIONER

## 2021-08-26 NOTE — ASSESSMENT & PLAN NOTE
Assessment completed  Blood was removed  Discussed limiting carbohydrate intake  Heart healthy diet and increased exercise activity  Discussed elevated cholesterol and glucose levels  Patient blood pressure is slightly elevated in office  Advised to check addressed if blood pressure continues to be elevated greater than 140/90   Advised to call office  Discussed the benefits of weight loss  Patient has a follow-up his eye exam   Documentation completed for work

## 2021-08-26 NOTE — PROGRESS NOTES
[facility] [address]     NAME: Bhumi Chicas  AGE: 41 y o  SEX: male  : 1975     DATE: 2021     Assessment and Plan:     Problem List Items Addressed This Visit        Other    Annual physical exam - Primary     Assessment completed  Blood was removed  Discussed limiting carbohydrate intake  Heart healthy diet and increased exercise activity  Discussed elevated cholesterol and glucose levels  Patient blood pressure is slightly elevated in office  Advised to check addressed if blood pressure continues to be elevated greater than 140/90   Advised to call office  Discussed the benefits of weight loss  Patient has a follow-up his eye exam   Documentation completed for work  Immunizations and preventive care screenings were discussed with patient today  Appropriate education was printed on patient's after visit summary  Counseling:  Alcohol/drug use: discussed moderation in alcohol intake, the recommendations for healthy alcohol use, and avoidance of illicit drug use  Dental Health: discussed importance of regular tooth brushing, flossing, and dental visits  Injury prevention: discussed safety/seat belts, safety helmets, smoke detectors, carbon dioxide detectors, and smoking near bedding or upholstery  Sexual health: discussed sexually transmitted diseases, partner selection, use of condoms, avoidance of unintended pregnancy, and contraceptive alternatives  · Exercise: the importance of regular exercise/physical activity was discussed  Recommend exercise 3-5 times per week for at least 30 minutes  BMI Counseling: Body mass index is 28 97 kg/m²   The BMI is above normal  Nutrition recommendations include decreasing portion sizes, encouraging healthy choices of fruits and vegetables, decreasing fast food intake, consuming healthier snacks, limiting drinks that contain sugar, moderation in carbohydrate intake, increasing intake of lean protein, reducing intake of saturated and trans fat and reducing intake of cholesterol  Exercise recommendations include exercising 3-5 times per week  No pharmacotherapy was ordered  Return in about 1 year (around 8/26/2022)  Chief Complaint:     Chief Complaint   Patient presents with    Physical Exam     form for work       History of Present Illness:     Adult Annual Physical   Patient here for a comprehensive physical exam  The patient reportsno problems    Diet and Physical Activity  · Diet/Nutrition: poor diet  · Exercise: no formal exercise  Depression Screening  PHQ-9 Depression Screening    PHQ-9:   Frequency of the following problems over the past two weeks:      Little interest or pleasure in doing things: 0 - not at all  Feeling down, depressed, or hopeless: 0 - not at all  PHQ-2 Score: 0       General Health  · Sleep: sleeps well  · Hearing: normal - bilateral   · Vision: most recent eye exam >1 year ago and wears glasses  · Dental: regular dental visits and brushes teeth twice daily   Health  · Symptoms include: none     Review of Systems:     Review of Systems   Constitutional: Negative  HENT: Negative  Eyes: Negative  Respiratory: Negative  Cardiovascular: Negative  Gastrointestinal: Negative  Endocrine: Negative  Genitourinary: Negative  Musculoskeletal: Negative  Skin: Negative  Allergic/Immunologic: Negative  Neurological: Negative  Hematological: Negative  Psychiatric/Behavioral: Negative         Past Medical History:     Past Medical History:   Diagnosis Date    Depression     JOYCELYN (obstructive sleep apnea)       Past Surgical History:     Past Surgical History:   Procedure Laterality Date    WISDOM TOOTH EXTRACTION        Family History:     Family History   Problem Relation Age of Onset    Cancer Mother     Colon cancer Mother     Heart disease Father Social History:     Social History     Socioeconomic History    Marital status: /Civil Union     Spouse name: None    Number of children: None    Years of education: None    Highest education level: None   Occupational History    None   Tobacco Use    Smoking status: Never Smoker    Smokeless tobacco: Never Used   Substance and Sexual Activity    Alcohol use: Yes    Drug use: Never    Sexual activity: None   Other Topics Concern    None   Social History Narrative    None     Social Determinants of Health     Financial Resource Strain:     Difficulty of Paying Living Expenses:    Food Insecurity:     Worried About Running Out of Food in the Last Year:     [address] in the Last Year:    Transportation Needs:     Lack of Transportation (Medical):  Lack of Transportation (Non-Medical):    Physical Activity:     Days of Exercise per Week:     Minutes of Exercise per Session:    Stress:     Feeling of Stress :    Social Connections:     Frequency of Communication with Friends and Family:     Frequency of Social Gatherings with Friends and Family:     Attends Rastafarian Services:     Active Member of Clubs or Organizations:     Attends Club or Organization Meetings:     Marital Status:    Intimate Partner Violence:     Fear of Current or Ex-Partner:     Emotionally Abused:     Physically Abused:     Sexually Abused:       Current Medications:     Current Outpatient Medications   Medication Sig Dispense Refill    dicyclomine (BENTYL) 20 mg tablet TAKE 1 TABLET BY MOUTH 3 TIMES A DAY AS NEEDED FOR ABDOMINAL PAIN (Patient not taking: Reported on 8/26/2021) 270 tablet 1    meloxicam (MOBIC) 15 mg tablet Take 1 tablet (15 mg total) by mouth daily With food PRN (Patient not taking: Reported on 8/26/2021) 30 tablet 1     No current facility-administered medications for this visit  Allergies:      Allergies   Allergen Reactions    Penicillins Hives and Itching      Physical Exam: /90   Pulse 70   Temp 98 3 °F (36 8 °C)   Resp 14   Ht 5' 9 6" (1 768 m)   Wt 90 5 kg (199 lb 9 6 oz)   SpO2 97%   BMI 28 97 kg/m²     Physical Exam  Vitals and nursing note reviewed  Constitutional:       Appearance: He is well-developed  He is obese  HENT:      Head: Normocephalic and atraumatic  Nose: Nose normal    Eyes:      Conjunctiva/sclera: Conjunctivae normal    Cardiovascular:      Rate and Rhythm: Normal rate and regular rhythm  Pulses: Normal pulses  Heart sounds: Normal heart sounds  No murmur heard  Pulmonary:      Effort: Pulmonary effort is normal  No respiratory distress  Breath sounds: Normal breath sounds  Abdominal:      Palpations: Abdomen is soft  Tenderness: There is no abdominal tenderness  Musculoskeletal:      Cervical back: Neck supple  Skin:     General: Skin is warm and dry  Neurological:      General: No focal deficit present  Mental Status: He is alert and oriented to person, place, and time  Psychiatric:         Mood and Affect: Mood normal          Behavior: Behavior normal          Thought Content:  Thought content normal          Judgment: Judgment normal           Tomasa Amezquita, 1958 [facility] [address] [facility]

## 2021-08-26 NOTE — PATIENT INSTRUCTIONS
Check blood pressure , keep log if greater than 140/90  Call office  Wellness Visit for Adults   AMBULATORY CARE:   A wellness visit  is when you see your healthcare provider to get screened for health problems  Your healthcare provider will also give you advice on how to stay healthy  Write down your questions so you remember to ask them  Ask your healthcare provider how often you should have a wellness visit  What happens at a wellness visit:  Your healthcare provider will ask about your health, and your family history of health problems  This includes high blood pressure, heart disease, and cancer  He or she will ask if you have symptoms that concern you, if you smoke, and about your mood  You may also be asked about your intake of medicines, supplements, food, and alcohol  Any of the following may be done:  · Your weight  will be checked  Your height may also be checked so your body mass index (BMI) can be calculated  Your BMI shows if you are at a healthy weight  · Your blood pressure  and heart rate will be checked  Your temperature may also be checked  · Blood and urine tests  may be done  Blood tests may be done to check your cholesterol levels  Abnormal cholesterol levels increase your risk for heart disease and stroke  You may also need a blood or urine test to check for diabetes if you are at increased risk  Urine tests may be done to look for signs of an infection or kidney disease  · A physical exam  includes checking your heartbeat and lungs with a stethoscope  Your healthcare provider may also check your skin to look for sun damage  · Screening tests  may be recommended  A screening test is done to check for diseases that may not cause symptoms  The screening tests you may need depend on your age, gender, family history, and lifestyle habits  For example, colorectal screening may be recommended if you are 48years old or older      Screening tests you need if you are a woman:   · A Pap smear  is used to screen for cervical cancer  Pap smears are usually done every 3 to 5 years depending on your age  You may need them more often if you have had abnormal Pap smear test results in the past  Ask your healthcare provider how often you should have a Pap smear  · A mammogram  is an x-ray of your breasts to screen for breast cancer  Experts recommend mammograms every 2 years starting at age 48 years  You may need a mammogram at age 52 years or younger if you have an increased risk for breast cancer  Talk to your healthcare provider about when you should start having mammograms and how often you need them  Vaccines you may need:   · Get an influenza vaccine  every year  The influenza vaccine protects you from the flu  Several types of viruses cause the flu  The viruses change over time, so new vaccines are made each year  · Get a tetanus-diphtheria (Td) booster vaccine  every 10 years  This vaccine protects you against tetanus and diphtheria  Tetanus is a severe infection that may cause painful muscle spasms and lockjaw  Diphtheria is a severe bacterial infection that causes a thick covering in the back of your mouth and throat  · Get a human papillomavirus (HPV) vaccine  if you are female and aged 23 to 32 or male 23 to 24 and never received it  This vaccine protects you from HPV infection  HPV is the most common infection spread by sexual contact  HPV may also cause vaginal, penile, and anal cancers  · Get a pneumococcal vaccine  if you are aged 72 years or older  The pneumococcal vaccine is an injection given to protect you from pneumococcal disease  Pneumococcal disease is an infection caused by pneumococcal bacteria  The infection may cause pneumonia, meningitis, or an ear infection  · Get a shingles vaccine  if you are 60 or older, even if you have had shingles before  The shingles vaccine is an injection to protect you from the varicella-zoster virus   This is the same virus that causes chickenpox  Shingles is a painful rash that develops in people who had chickenpox or have been exposed to the virus  How to eat healthy:  My Plate is a model for planning healthy meals  It shows the types and amounts of foods that should go on your plate  Fruits and vegetables make up about half of your plate, and grains and protein make up the other half  A serving of dairy is included on the side of your plate  The amount of calories and serving sizes you need depends on your age, gender, weight, and height  Examples of healthy foods are listed below:  · Eat a variety of vegetables  such as dark green, red, and orange vegetables  You can also include canned vegetables low in sodium (salt) and frozen vegetables without added butter or sauces  · Eat a variety of fresh fruits , canned fruit in 100% juice, frozen fruit, and dried fruit  · Include whole grains  At least half of the grains you eat should be whole grains  Examples include whole-wheat bread, wheat pasta, brown rice, and whole-grain cereals such as oatmeal     · Eat a variety of protein foods such as seafood (fish and shellfish), lean meat, and poultry without skin (turkey and chicken)  Examples of lean meats include pork leg, shoulder, or tenderloin, and beef round, sirloin, tenderloin, and extra lean ground beef  Other protein foods include eggs and egg substitutes, beans, peas, soy products, nuts, and seeds  · Choose low-fat dairy products such as skim or 1% milk or low-fat yogurt, cheese, and cottage cheese  · Limit unhealthy fats  such as butter, hard margarine, and shortening  Exercise:  Exercise at least 30 minutes per day on most days of the week  Some examples of exercise include walking, biking, dancing, and swimming  You can also fit in more physical activity by taking the stairs instead of the elevator or parking farther away from stores  Include muscle strengthening activities 2 days each week   Regular exercise provides many health benefits  It helps you manage your weight, and decreases your risk for type 2 diabetes, heart disease, stroke, and high blood pressure  Exercise can also help improve your mood  Ask your healthcare provider about the best exercise plan for you  General health and safety guidelines:   · Do not smoke  Nicotine and other chemicals in cigarettes and cigars can cause lung damage  Ask your healthcare provider for information if you currently smoke and need help to quit  E-cigarettes or smokeless tobacco still contain nicotine  Talk to your healthcare provider before you use these products  · Limit alcohol  A drink of alcohol is 12 ounces of beer, 5 ounces of wine, or 1½ ounces of liquor  · Lose weight, if needed  Being overweight increases your risk of certain health conditions  These include heart disease, high blood pressure, type 2 diabetes, and certain types of cancer  · Protect your skin  Do not sunbathe or use tanning beds  Use sunscreen with a SPF 15 or higher  Apply sunscreen at least 15 minutes before you go outside  Reapply sunscreen every 2 hours  Wear protective clothing, hats, and sunglasses when you are outside  · Drive safely  Always wear your seatbelt  Make sure everyone in your car wears a seatbelt  A seatbelt can save your life if you are in an accident  Do not use your cell phone when you are driving  This could distract you and cause an accident  Pull over if you need to make a call or send a text message  · Practice safe sex  Use latex condoms if are sexually active and have more than one partner  Your healthcare provider may recommend screening tests for sexually transmitted infections (STIs)  · Wear helmets, lifejackets, and protective gear  Always wear a helmet when you ride a bike or motorcycle, go skiing, or play sports that could cause a head injury  Wear protective equipment when you play sports   Wear a lifejacket when you are on a boat or doing water sports  © Copyright [employer] 2021 Information is for End User's use only and may not be sold, redistributed or otherwise used for commercial purposes  All illustrations and images included in CareNotes® are the copyrighted property of A D A M [employer] Inc  or Vicente Kim  The above information is an  only  It is not intended as medical advice for individual conditions or treatments  Talk to your doctor, nurse or pharmacist before following any medical regimen to see if it is safe and effective for you

## 2021-12-17 ENCOUNTER — IMMUNIZATIONS (OUTPATIENT)
Dept: FAMILY MEDICINE CLINIC | Facility: HOSPITAL | Age: 46
End: 2021-12-17

## 2021-12-17 DIAGNOSIS — Z23 ENCOUNTER FOR IMMUNIZATION: Primary | ICD-10-CM

## 2021-12-17 PROCEDURE — 91300 COVID-19 PFIZER VACC 0.3 ML: CPT

## 2021-12-17 PROCEDURE — 0001A COVID-19 PFIZER VACC 0.3 ML: CPT

## 2022-01-31 DIAGNOSIS — K58.0 IRRITABLE BOWEL SYNDROME WITH DIARRHEA: ICD-10-CM

## 2022-01-31 RX ORDER — DICYCLOMINE HCL 20 MG
TABLET ORAL
Qty: 270 TABLET | Refills: 1 | Status: SHIPPED | OUTPATIENT
Start: 2022-01-31

## 2022-05-06 ENCOUNTER — TELEPHONE (OUTPATIENT)
Dept: FAMILY MEDICINE CLINIC | Facility: CLINIC | Age: 47
End: 2022-05-06

## 2022-05-06 NOTE — TELEPHONE ENCOUNTER
Would like a referral for dermatologist (claims he is having skin issues ) and would like a referral for his eyes (feels like he is becoming near sighted)  Last seen on 08/26/2021  Please let me know if you would for him to schedule a office visit

## 2022-05-10 ENCOUNTER — OFFICE VISIT (OUTPATIENT)
Dept: FAMILY MEDICINE CLINIC | Facility: CLINIC | Age: 47
End: 2022-05-10
Payer: COMMERCIAL

## 2022-05-10 VITALS
BODY MASS INDEX: 28.03 KG/M2 | SYSTOLIC BLOOD PRESSURE: 130 MMHG | HEIGHT: 70 IN | OXYGEN SATURATION: 97 % | RESPIRATION RATE: 18 BRPM | DIASTOLIC BLOOD PRESSURE: 86 MMHG | HEART RATE: 72 BPM | WEIGHT: 195.8 LBS

## 2022-05-10 DIAGNOSIS — B07.9 VIRAL WARTS, UNSPECIFIED TYPE: Primary | ICD-10-CM

## 2022-05-10 DIAGNOSIS — B49 FUNGAL INFECTION: ICD-10-CM

## 2022-05-10 DIAGNOSIS — R00.2 PALPITATIONS: ICD-10-CM

## 2022-05-10 PROCEDURE — 93000 ELECTROCARDIOGRAM COMPLETE: CPT | Performed by: NURSE PRACTITIONER

## 2022-05-10 PROCEDURE — 3725F SCREEN DEPRESSION PERFORMED: CPT | Performed by: NURSE PRACTITIONER

## 2022-05-10 PROCEDURE — 3008F BODY MASS INDEX DOCD: CPT | Performed by: NURSE PRACTITIONER

## 2022-05-10 PROCEDURE — 99214 OFFICE O/P EST MOD 30 MIN: CPT | Performed by: NURSE PRACTITIONER

## 2022-05-10 PROCEDURE — 1036F TOBACCO NON-USER: CPT | Performed by: NURSE PRACTITIONER

## 2022-05-10 RX ORDER — CLOTRIMAZOLE 1 %
CREAM (GRAM) TOPICAL 2 TIMES DAILY
Qty: 30 G | Refills: 0 | Status: SHIPPED | OUTPATIENT
Start: 2022-05-10

## 2022-05-10 NOTE — PATIENT INSTRUCTIONS
Get blood work done  Get Holter monitor  Apply cream to foot nightly x 2 weeks   keep covered so medication stays in place  Follow with dermatology

## 2022-05-10 NOTE — PROGRESS NOTES
Assessment/Plan:     Viral warts  Raised, white, dry, annular keratotic lesions on finger    Dermatology referral    Fungal infection  White, flat annular lesions on plantar foot/heel    Cotrimazole ordered  Palpitations  Occasional "skip beats"     EKG done  Holter monitor and CMP ordered  Problem List Items Addressed This Visit        Other    Viral warts - Primary     Raised, white, dry, annular keratotic lesions on finger    Dermatology referral         Relevant Orders    Ambulatory Referral to Dermatology    Fungal infection     White, flat annular lesions on plantar foot/heel    Cotrimazole ordered  Relevant Medications    clotrimazole (LOTRIMIN) 1 % cream    Palpitations     Occasional "skip beats"     EKG done  Holter monitor and CMP ordered  Relevant Orders    Comprehensive metabolic panel    Holter monitor    POCT ECG (Completed)            Subjective:      Patient ID: Anna Dalal is a 40 y o  male  A 78-year-old male presents with raised, dry, keratotic, white "wart-like"  lesions on left thumb x few weeks and dry, white, flat lesions on right plantar foot x 2 days after 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  Pt had tried OTC wart gel and cryotherapy but lesions did not resolved  Pt also c/o having occasional "irregular" heart beats, feels like it's skipping beats  The following portions of the patient's history were reviewed and updated as appropriate: allergies, current medications, past family history, past medical history, past social history, past surgical history and problem list     Review of Systems   Constitutional: Negative  Negative for activity change, fatigue and unexpected weight change  HENT: Negative  Eyes: Negative  Respiratory: Negative for cough, chest tightness and shortness of breath  Cardiovascular:        Skip beats   Gastrointestinal: Negative  Skin: Positive for color change          Warts on finger  White lesions/rash on plantar foot/heel   Neurological: Positive for dizziness and headaches  Negative for light-headedness  Psychiatric/Behavioral: Negative  Objective:      /86   Pulse 72   Resp 18   Ht 5' 9 5" (1 765 m)   Wt 88 8 kg (195 lb 12 8 oz)   SpO2 97%   BMI 28 50 kg/m²          Physical Exam  Vitals and nursing note reviewed  Constitutional:       Appearance: Normal appearance  He is well-developed and normal weight  HENT:      Head: Normocephalic and atraumatic  Eyes:      Pupils: Pupils are equal, round, and reactive to light  Cardiovascular:      Rate and Rhythm: Normal rate and regular rhythm  Pulmonary:      Effort: Pulmonary effort is normal       Breath sounds: Normal breath sounds  Musculoskeletal:         General: Normal range of motion  Cervical back: Normal range of motion  Skin:     General: Skin is warm and dry  Findings: Lesion and rash present  Comments: Raised, dry, white, keratotic lesion on finger  White flat rash on plantar foot/heel     Neurological:      Mental Status: He is alert and oriented to person, place, and time  Psychiatric:         Behavior: Behavior normal          Thought Content:  Thought content normal          Judgment: Judgment normal

## 2022-05-31 ENCOUNTER — TELEPHONE (OUTPATIENT)
Dept: FAMILY MEDICINE CLINIC | Facility: CLINIC | Age: 47
End: 2022-05-31

## 2022-06-01 DIAGNOSIS — Z00.00 HEALTH CARE MAINTENANCE: Primary | ICD-10-CM

## 2022-06-03 ENCOUNTER — APPOINTMENT (OUTPATIENT)
Dept: LAB | Facility: HOSPITAL | Age: 47
End: 2022-06-03
Payer: COMMERCIAL

## 2022-06-03 DIAGNOSIS — Z00.00 HEALTH CARE MAINTENANCE: ICD-10-CM

## 2022-06-03 LAB
ALBUMIN SERPL BCP-MCNC: 3.9 G/DL (ref 3.5–5)
ALP SERPL-CCNC: 53 U/L (ref 46–116)
ALT SERPL W P-5'-P-CCNC: 32 U/L (ref 12–78)
ANION GAP SERPL CALCULATED.3IONS-SCNC: 8 MMOL/L (ref 4–13)
AST SERPL W P-5'-P-CCNC: 25 U/L (ref 5–45)
BASOPHILS # BLD AUTO: 0.07 THOUSANDS/ΜL (ref 0–0.1)
BASOPHILS NFR BLD AUTO: 2 % (ref 0–1)
BILIRUB SERPL-MCNC: 0.7 MG/DL (ref 0.2–1)
BUN SERPL-MCNC: 20 MG/DL (ref 5–25)
CALCIUM SERPL-MCNC: 8.8 MG/DL (ref 8.3–10.1)
CHLORIDE SERPL-SCNC: 106 MMOL/L (ref 100–108)
CHOLEST SERPL-MCNC: 239 MG/DL
CO2 SERPL-SCNC: 28 MMOL/L (ref 21–32)
CREAT SERPL-MCNC: 0.89 MG/DL (ref 0.6–1.3)
EOSINOPHIL # BLD AUTO: 0.16 THOUSAND/ΜL (ref 0–0.61)
EOSINOPHIL NFR BLD AUTO: 4 % (ref 0–6)
ERYTHROCYTE [DISTWIDTH] IN BLOOD BY AUTOMATED COUNT: 12.5 % (ref 11.6–15.1)
GFR SERPL CREATININE-BSD FRML MDRD: 103 ML/MIN/1.73SQ M
GLUCOSE P FAST SERPL-MCNC: 94 MG/DL (ref 65–99)
HCT VFR BLD AUTO: 48.6 % (ref 36.5–49.3)
HDLC SERPL-MCNC: 64 MG/DL
HGB BLD-MCNC: 16 G/DL (ref 12–17)
IMM GRANULOCYTES # BLD AUTO: 0.01 THOUSAND/UL (ref 0–0.2)
IMM GRANULOCYTES NFR BLD AUTO: 0 % (ref 0–2)
LDLC SERPL CALC-MCNC: 165 MG/DL (ref 0–100)
LYMPHOCYTES # BLD AUTO: 1.58 THOUSANDS/ΜL (ref 0.6–4.47)
LYMPHOCYTES NFR BLD AUTO: 35 % (ref 14–44)
MCH RBC QN AUTO: 30.6 PG (ref 26.8–34.3)
MCHC RBC AUTO-ENTMCNC: 32.9 G/DL (ref 31.4–37.4)
MCV RBC AUTO: 93 FL (ref 82–98)
MONOCYTES # BLD AUTO: 0.48 THOUSAND/ΜL (ref 0.17–1.22)
MONOCYTES NFR BLD AUTO: 11 % (ref 4–12)
NEUTROPHILS # BLD AUTO: 2.17 THOUSANDS/ΜL (ref 1.85–7.62)
NEUTS SEG NFR BLD AUTO: 48 % (ref 43–75)
NONHDLC SERPL-MCNC: 175 MG/DL
NRBC BLD AUTO-RTO: 0 /100 WBCS
PLATELET # BLD AUTO: 181 THOUSANDS/UL (ref 149–390)
PMV BLD AUTO: 12.7 FL (ref 8.9–12.7)
POTASSIUM SERPL-SCNC: 4.7 MMOL/L (ref 3.5–5.3)
PROT SERPL-MCNC: 6.9 G/DL (ref 6.4–8.2)
RBC # BLD AUTO: 5.23 MILLION/UL (ref 3.88–5.62)
SODIUM SERPL-SCNC: 142 MMOL/L (ref 136–145)
TRIGL SERPL-MCNC: 52 MG/DL
WBC # BLD AUTO: 4.47 THOUSAND/UL (ref 4.31–10.16)

## 2022-06-03 PROCEDURE — 80061 LIPID PANEL: CPT

## 2022-06-03 PROCEDURE — 85025 COMPLETE CBC W/AUTO DIFF WBC: CPT

## 2022-06-03 PROCEDURE — 80053 COMPREHEN METABOLIC PANEL: CPT

## 2022-06-03 PROCEDURE — 36415 COLL VENOUS BLD VENIPUNCTURE: CPT

## 2022-06-25 ENCOUNTER — HOSPITAL ENCOUNTER (EMERGENCY)
Facility: HOSPITAL | Age: 47
Discharge: HOME | End: 2022-06-25
Attending: EMERGENCY MEDICINE | Admitting: EMERGENCY MEDICINE
Payer: COMMERCIAL

## 2022-06-25 VITALS
SYSTOLIC BLOOD PRESSURE: 133 MMHG | RESPIRATION RATE: 16 BRPM | DIASTOLIC BLOOD PRESSURE: 67 MMHG | HEART RATE: 68 BPM | OXYGEN SATURATION: 98 % | TEMPERATURE: 99 F

## 2022-06-25 DIAGNOSIS — T78.2XXA ANAPHYLAXIS: Primary | ICD-10-CM

## 2022-06-25 PROCEDURE — 96372 THER/PROPH/DIAG INJ SC/IM: CPT

## 2022-06-25 PROCEDURE — 99291 CRITICAL CARE FIRST HOUR: CPT | Performed by: EMERGENCY MEDICINE

## 2022-06-25 PROCEDURE — 96374 THER/PROPH/DIAG INJ IV PUSH: CPT

## 2022-06-25 PROCEDURE — 99283 EMERGENCY DEPT VISIT LOW MDM: CPT

## 2022-06-25 PROCEDURE — 96375 TX/PRO/DX INJ NEW DRUG ADDON: CPT

## 2022-06-25 RX ORDER — EPINEPHRINE 1 MG/ML
0.3 INJECTION, SOLUTION, CONCENTRATE INTRAVENOUS ONCE
Status: COMPLETED | OUTPATIENT
Start: 2022-06-25 | End: 2022-06-25

## 2022-06-25 RX ORDER — METHYLPREDNISOLONE SODIUM SUCCINATE 125 MG/2ML
125 INJECTION, POWDER, LYOPHILIZED, FOR SOLUTION INTRAMUSCULAR; INTRAVENOUS ONCE
Status: COMPLETED | OUTPATIENT
Start: 2022-06-25 | End: 2022-06-25

## 2022-06-25 RX ORDER — DIPHENHYDRAMINE HYDROCHLORIDE 50 MG/ML
50 INJECTION INTRAMUSCULAR; INTRAVENOUS ONCE
Status: COMPLETED | OUTPATIENT
Start: 2022-06-25 | End: 2022-06-25

## 2022-06-25 RX ORDER — FAMOTIDINE 20 MG/1
20 TABLET, FILM COATED ORAL ONCE
Status: COMPLETED | OUTPATIENT
Start: 2022-06-25 | End: 2022-06-25

## 2022-06-25 RX ORDER — PREDNISONE 20 MG/1
40 TABLET ORAL DAILY
Qty: 10 TABLET | Refills: 0 | Status: SHIPPED | OUTPATIENT
Start: 2022-06-25 | End: 2022-06-30

## 2022-06-25 RX ORDER — EPINEPHRINE 0.3 MG/.3ML
0.3 INJECTION SUBCUTANEOUS ONCE
Qty: 0.6 ML | Refills: 0 | Status: SHIPPED | OUTPATIENT
Start: 2022-06-25 | End: 2022-06-25

## 2022-06-25 RX ADMIN — EPINEPHRINE 0.3 MG: 1 INJECTION, SOLUTION, CONCENTRATE INTRAVENOUS at 19:44

## 2022-06-25 RX ADMIN — EPINEPHRINE 0.3 MG: 1 INJECTION, SOLUTION, CONCENTRATE INTRAVENOUS at 20:15

## 2022-06-25 RX ADMIN — FAMOTIDINE 20 MG: 20 TABLET ORAL at 19:49

## 2022-06-25 RX ADMIN — METHYLPREDNISOLONE SODIUM SUCCINATE 125 MG: 125 INJECTION, POWDER, FOR SOLUTION INTRAMUSCULAR; INTRAVENOUS at 19:48

## 2022-06-25 RX ADMIN — DIPHENHYDRAMINE HYDROCHLORIDE 50 MG: 50 INJECTION, SOLUTION INTRAMUSCULAR; INTRAVENOUS at 19:47

## 2022-06-28 NOTE — ED PROVIDER NOTES
History  Chief Complaint   Patient presents with    Allergic Reaction     Bee sting to right lower eyelid approx 30 min prior to arrival     70-year-old male presenting to the emergency department for evaluation of allergic reaction  Patient was stung just below the right eye with be, then developed diffuse urticarial rash, pallor, chest tightness, and a general overall sense of malaise  He arrives feeling very unwell, somewhat obtunded, diaphoretic  He is nauseated without vomiting  Describes some diffuse cramping abdominal pain  No diarrhea  No wheeze or shortness of breath  Prior to Admission Medications   Prescriptions Last Dose Informant Patient Reported? Taking? clotrimazole (LOTRIMIN) 1 % cream   No No   Sig: Apply topically 2 (two) times a day   dicyclomine (BENTYL) 20 mg tablet   No No   Sig: TAKE 1 TABLET BY MOUTH 3 TIMES A DAY AS NEEDED FOR ABDOMINAL PAIN   meloxicam (MOBIC) 15 mg tablet   No No   Sig: Take 1 tablet (15 mg total) by mouth daily With food PRN   Patient not taking: Reported on 8/26/2021      Facility-Administered Medications: None       Past Medical History:   Diagnosis Date    Depression     JOYCELYN (obstructive sleep apnea)        Past Surgical History:   Procedure Laterality Date    WISDOM TOOTH EXTRACTION         Family History   Problem Relation Age of Onset    Cancer Mother     Colon cancer Mother     Heart disease Father      I have reviewed and agree with the history as documented  E-Cigarette/Vaping    E-Cigarette Use Never User      E-Cigarette/Vaping Substances     Social History     Tobacco Use    Smoking status: Never Smoker    Smokeless tobacco: Never Used   Vaping Use    Vaping Use: Never used   Substance Use Topics    Alcohol use: Yes    Drug use: Never       Review of Systems   Constitutional: Positive for fatigue  Negative for appetite change, chills and fever  HENT: Negative for sneezing and sore throat      Eyes: Negative for visual disturbance  Respiratory: Positive for chest tightness  Negative for cough, choking, shortness of breath and wheezing  Cardiovascular: Negative for chest pain and palpitations  Gastrointestinal: Negative for abdominal pain, constipation, diarrhea, nausea and vomiting  Genitourinary: Negative for difficulty urinating and dysuria  Skin: Positive for rash  Neurological: Negative for dizziness, weakness, light-headedness, numbness and headaches  All other systems reviewed and are negative  Physical Exam  Physical Exam  Vitals and nursing note reviewed  Constitutional:       General: He is not in acute distress  Appearance: He is well-developed  He is ill-appearing and diaphoretic  HENT:      Head: Normocephalic and atraumatic  Eyes:      Pupils: Pupils are equal, round, and reactive to light  Neck:      Vascular: No JVD  Trachea: No tracheal deviation  Cardiovascular:      Rate and Rhythm: Normal rate and regular rhythm  Heart sounds: Normal heart sounds  No murmur heard  No friction rub  No gallop  Pulmonary:      Effort: Pulmonary effort is normal  No respiratory distress  Breath sounds: Normal breath sounds  No wheezing or rales  Abdominal:      General: Bowel sounds are normal  There is no distension  Palpations: Abdomen is soft  Tenderness: There is no abdominal tenderness  There is no guarding or rebound  Skin:     General: Skin is warm  Coloration: Skin is not pale  Comments: Diffuse urticarial rash noted from head through the lower extremities  Skin is pale  Neurological:      Mental Status: He is alert and oriented to person, place, and time  Cranial Nerves: No cranial nerve deficit  Motor: No abnormal muscle tone     Psychiatric:         Behavior: Behavior normal          Vital Signs  ED Triage Vitals [06/25/22 1941]   Temperature Pulse Respirations Blood Pressure SpO2   99 °F (37 2 °C) 66 19 105/59 98 %      Temp Source Heart Rate Source Patient Position - Orthostatic VS BP Location FiO2 (%)   Oral Monitor Sitting Left arm --      Pain Score       --           Vitals:    06/25/22 2000 06/25/22 2030 06/25/22 2205 06/25/22 2300   BP: 128/66 132/81 132/77 133/67   Pulse: 74 74 81 68   Patient Position - Orthostatic VS: Sitting Sitting Sitting Sitting         Visual Acuity      ED Medications  Medications   EPINEPHrine PF (ADRENALIN) 1 mg/mL injection 0 3 mg (0 3 mg Intramuscular Given 6/25/22 1944)   methylPREDNISolone sodium succinate (Solu-MEDROL) injection 125 mg (125 mg Intravenous Given 6/25/22 1948)   diphenhydrAMINE (BENADRYL) injection 50 mg (50 mg Intravenous Given 6/25/22 1947)   famotidine (PEPCID) tablet 20 mg (20 mg Oral Given 6/25/22 1949)   EPINEPHrine PF (ADRENALIN) 1 mg/mL injection 0 3 mg (0 3 mg Intramuscular Given 6/25/22 2015)       Diagnostic Studies  Results Reviewed     None                 No orders to display              Procedures  CriticalCare Time  Performed by: Rhea Resendez MD  Authorized by: Rhea Resendez MD     Critical care provider statement:     Critical care time (minutes):  60    Critical care start time:  6/25/2022 10:16 PM    Critical care end time:  6/25/2022 11:16 PM    Critical care time was exclusive of:  Separately billable procedures and treating other patients    Critical care was necessary to treat or prevent imminent or life-threatening deterioration of the following conditions:  Cardiac failure, circulatory failure, CNS failure or compromise, dehydration, endocrine crisis, hepatic failure, metabolic crisis, shock, respiratory failure and renal failure    Critical care was time spent personally by me on the following activities:  Obtaining history from patient or surrogate, development of treatment plan with patient or surrogate, evaluation of patient's response to treatment, examination of patient, interpretation of cardiac output measurements, ordering and performing treatments and interventions, re-evaluation of patient's condition and review of old charts             ED Course  ED Course as of 06/28/22 0451   Sat Jun 25, 2022 2009 On reassessment, patient feels somewhat better, still has diffuse urticarial rash, blood pressure improved, will administer additional dose of IM epi, continue to monitor  MDM  Number of Diagnoses or Management Options  Anaphylaxis  Diagnosis management comments: 28-year-old male with anaphylaxis, low blood pressure noted, no wheeze or evidence of acute airway compromise, however patient is very ill appearing, will give appy, steroids, Benadryl, H2 blockade, observed  Patient's condition not significantly changed after 15-20 minutes, repeated epinephrine administration, continue to monitor  Patient is symptoms have now improved, patient feeling much better, patient's rash is resolved  Will discharge with steroids and prescription for EpiPen  Disposition  Final diagnoses:   Anaphylaxis     Time reflects when diagnosis was documented in both MDM as applicable and the Disposition within this note     Time User Action Codes Description Comment    6/25/2022 11:03 PM Iris, [address] [facility]  2XXA] Anaphylaxis       ED Disposition     ED Disposition   Discharge    Condition   Stable    Date/Time   Sat Jun 25, 2022 11:03 PM    Comment   Kal Vizcarra discharge to home/self care                 Follow-up Information     Follow up With Specialties Details Why [id number] [id number] [place] [id number]  [phone]            Discharge Medication List as of 6/25/2022 11:04 PM      START taking these medications    Details   EPINEPHrine (EPIPEN) 0 3 mg/0 3 mL SOAJ Inject 0 3 mL (0 3 mg total) into a muscle once for 1 dose, Starting Sat 6/25/2022, Normal      predniSONE 20 mg tablet Take 2 tablets (40 mg total) by mouth daily for 5 days, Starting Sat 6/25/2022, Until Thu 6/30/2022, Normal         CONTINUE these medications which have NOT CHANGED    Details   clotrimazole (LOTRIMIN) 1 % cream Apply topically 2 (two) times a day, Starting Tue 5/10/2022, Normal      dicyclomine (BENTYL) 20 mg tablet TAKE 1 TABLET BY MOUTH 3 TIMES A DAY AS NEEDED FOR ABDOMINAL PAIN, Normal      meloxicam (MOBIC) 15 mg tablet Take 1 tablet (15 mg total) by mouth daily With food PRN, Starting Fri 11/8/2019, Normal             No discharge procedures on file      PDMP Review     None          ED Provider  Electronically Signed by           Renay Parham MD  06/28/22 0988

## 2022-07-05 ENCOUNTER — TELEPHONE (OUTPATIENT)
Dept: FAMILY MEDICINE CLINIC | Facility: CLINIC | Age: 47
End: 2022-07-05

## 2022-07-05 DIAGNOSIS — Z91.030 BEE STING ALLERGY: Primary | ICD-10-CM

## 2022-07-05 RX ORDER — PREDNISONE 20 MG/1
20 TABLET ORAL DAILY
Qty: 5 TABLET | Refills: 0 | Status: SHIPPED | OUTPATIENT
Start: 2022-07-05 | End: 2023-06-29 | Stop reason: ALTCHOICE

## 2022-07-05 NOTE — TELEPHONE ENCOUNTER
Janey Mccormick experienced hornet sting on his face  Went to [facility] over the weekend   Prescribed prednisone   Problem now that he is having is now that the medication is done he is experiencing some swelling on his face   He would like to know what else could be done? Would you like for me to schedule an ovs for him ?

## 2022-12-16 DIAGNOSIS — K58.0 IRRITABLE BOWEL SYNDROME WITH DIARRHEA: ICD-10-CM

## 2022-12-16 RX ORDER — DICYCLOMINE HCL 20 MG
TABLET ORAL
Qty: 270 TABLET | Refills: 1 | Status: SHIPPED | OUTPATIENT
Start: 2022-12-16

## 2023-06-29 ENCOUNTER — TELEMEDICINE (OUTPATIENT)
Dept: FAMILY MEDICINE CLINIC | Facility: CLINIC | Age: 48
End: 2023-06-29

## 2023-06-29 VITALS — TEMPERATURE: 97.3 F

## 2023-06-29 DIAGNOSIS — U07.1 COVID-19: Primary | ICD-10-CM
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
## 2023-06-29 PROBLEM — R19.7 DIARRHEA: Status: RESOLVED | Noted: 2019-10-23 | Resolved: 2023-06-29

## 2023-06-29 RX ORDER — NIRMATRELVIR AND RITONAVIR 300-100 MG
3 KIT ORAL 2 TIMES DAILY
Qty: 30 TABLET | Refills: 0 | Status: SHIPPED | OUTPATIENT
Start: 2023-06-29 | End: 2023-07-04

## 2023-06-29 RX ORDER — BROMPHENIRAMINE MALEATE, PSEUDOEPHEDRINE HYDROCHLORIDE, AND DEXTROMETHORPHAN HYDROBROMIDE 2; 30; 10 MG/5ML; MG/5ML; MG/5ML
5 SYRUP ORAL 4 TIMES DAILY PRN
Qty: 118 ML | Refills: 0 | Status: SHIPPED | OUTPATIENT
Start: 2023-06-29

## 2023-06-29 NOTE — PROGRESS NOTES
Virtual Regular Visit    Verification of patient location:    Patient is located at Home in the following state in which I hold an active license [place]      Assessment/Plan:    Problem List Items Addressed This Visit        Other    COVID-19 - Primary     To begin Paxlovid and Bromfed-DM as needed for cough  Advised to begin vitamin C, zinc, and vitamin D  Counseled on the importance of increasing fluid intake, resting, and monitoring oxygen with pulse ox  To ER for oxygen less than 90%  Counseled on duration of quarantine and masking time  To follow-up if no improvement in 1 week or sooner if symptoms worsen  Relevant Medications    nirmatrelvir & ritonavir (Paxlovid, 300/100,) tablet therapy pack    brompheniramine-pseudoephedrine-DM 30-2-10 MG/5ML syrup            Reason for visit is   Chief Complaint   Patient presents with   • Virtual Regular Visit        Encounter provider Antonella Carnes    Provider located at [facility] [address] [address] [phone]      Recent Visits  No visits were found meeting these conditions  Showing recent visits within past 7 days and meeting all other requirements  Today's Visits  Date Type Provider Dept   06/29/23 Telemedicine MARGO Carnes [facility] today's visits and meeting all other requirements  Future Appointments  No visits were found meeting these conditions  Showing future appointments within next 150 days and meeting all other requirements       The patient was identified by name and date of birth  Kamron Wyatt was informed that this is a telemedicine visit and that the visit is being conducted through the [address] [employer] platform  He agrees to proceed     My office door was closed  No one else was in the room  He acknowledged consent and understanding of privacy and security of the video platform   The patient has agreed to participate and understands they can discontinue the visit at any time  Patient is aware this is a billable service  Subjective  Mary Townsend is a 56 y o  male    Cagle Zeke presents via virtual visit  On Monday, he developed body aches  Tuesday, he tested positive for COVID  He is experiencing subjective fever, chills, congestion, sore throat, headache, and cough  Denies shortness of breath, wheezing, or chest tightness  Nothing has been used to treat his symptoms  He is fully vaccinated  Past Medical History:   Diagnosis Date   • Depression    • JOYCELYN (obstructive sleep apnea)        Past Surgical History:   Procedure Laterality Date   • WISDOM TOOTH EXTRACTION         Current Outpatient Medications   Medication Sig Dispense Refill   • brompheniramine-pseudoephedrine-DM 30-2-10 MG/5ML syrup Take 5 mL by mouth 4 (four) times a day as needed for cough 118 mL 0   • nirmatrelvir & ritonavir (Paxlovid, 300/100,) tablet therapy pack Take 3 tablets by mouth 2 (two) times a day for 5 days Take 2 nirmatrelvir tablets + 1 ritonavir tablet together per dose 30 tablet 0   • clotrimazole (LOTRIMIN) 1 % cream Apply topically 2 (two) times a day 30 g 0   • EPINEPHrine (EPIPEN) 0 3 mg/0 3 mL SOAJ Inject 0 3 mL (0 3 mg total) into a muscle once for 1 dose 0 6 mL 0     No current facility-administered medications for this visit  Allergies   Allergen Reactions   • Penicillins Hives and Itching       Review of Systems   Constitutional: Positive for chills and fever (subjective)  HENT: Positive for congestion, rhinorrhea and sore throat  Loss of taste of smell   Respiratory: Positive for cough  Cardiovascular: Negative  Musculoskeletal: Positive for myalgias  Neurological: Positive for headaches  Psychiatric/Behavioral: Negative  Video Exam    Vitals:    06/29/23 1126   Temp: (!) 97 3 °F (36 3 °C)       Physical Exam  Vitals and nursing note reviewed     Constitutional: Appearance: Normal appearance  HENT:      Head: Normocephalic and atraumatic  Pulmonary:      Effort: Pulmonary effort is normal    Musculoskeletal:      Cervical back: Neck supple  Neurological:      General: No focal deficit present  Mental Status: He is alert and oriented to person, place, and time  Psychiatric:         Mood and Affect: Mood normal          Behavior: Behavior normal          Thought Content:  Thought content normal          Judgment: Judgment normal           Visit Time  Total Visit Duration: 10

## 2023-06-29 NOTE — ASSESSMENT & PLAN NOTE
To begin Paxlovid and Bromfed-DM as needed for cough  Advised to begin vitamin C, zinc, and vitamin D  Counseled on the importance of increasing fluid intake, resting, and monitoring oxygen with pulse ox  To ER for oxygen less than 90%  Counseled on duration of quarantine and masking time  To follow-up if no improvement in 1 week or sooner if symptoms worsen

## 2023-07-11 ENCOUNTER — TELEPHONE (OUTPATIENT)
Dept: FAMILY MEDICINE CLINIC | Facility: CLINIC | Age: 48
End: 2023-07-11

## 2023-07-11 DIAGNOSIS — Z00.00 HEALTH CARE MAINTENANCE: Primary | ICD-10-CM

## 2023-07-11 NOTE — TELEPHONE ENCOUNTER
Spoke with patient's wife- they are due for their annual bloodwork. She stated that they absolutely need a Lipid and Glucose Panel done for her insurance- they receive a discount if they have these tests done yearly. They would like to go for the bloodwork on 07/14/2023, please. Thank you!

## 2023-07-14 ENCOUNTER — APPOINTMENT (OUTPATIENT)
Dept: LAB | Facility: HOSPITAL | Age: 48
End: 2023-07-14
Payer: COMMERCIAL

## 2023-07-14 DIAGNOSIS — Z00.00 HEALTH CARE MAINTENANCE: ICD-10-CM

## 2023-07-14 LAB
ALBUMIN SERPL BCP-MCNC: 4.4 G/DL (ref 3.5–5)
ALP SERPL-CCNC: 47 U/L (ref 34–104)
ALT SERPL W P-5'-P-CCNC: 23 U/L (ref 7–52)
ANION GAP SERPL CALCULATED.3IONS-SCNC: 3 MMOL/L
AST SERPL W P-5'-P-CCNC: 20 U/L (ref 13–39)
BASOPHILS # BLD AUTO: 0.05 THOUSANDS/ÂΜL (ref 0–0.1)
BASOPHILS NFR BLD AUTO: 1 % (ref 0–1)
BILIRUB SERPL-MCNC: 0.62 MG/DL (ref 0.2–1)
BUN SERPL-MCNC: 22 MG/DL (ref 5–25)
CALCIUM SERPL-MCNC: 9.4 MG/DL (ref 8.4–10.2)
CHLORIDE SERPL-SCNC: 107 MMOL/L (ref 96–108)
CHOLEST SERPL-MCNC: 224 MG/DL
CO2 SERPL-SCNC: 29 MMOL/L (ref 21–32)
CREAT SERPL-MCNC: 0.97 MG/DL (ref 0.6–1.3)
EOSINOPHIL # BLD AUTO: 0.19 THOUSAND/ÂΜL (ref 0–0.61)
EOSINOPHIL NFR BLD AUTO: 4 % (ref 0–6)
ERYTHROCYTE [DISTWIDTH] IN BLOOD BY AUTOMATED COUNT: 12.4 % (ref 11.6–15.1)
GFR SERPL CREATININE-BSD FRML MDRD: 93 ML/MIN/1.73SQ M
GLUCOSE P FAST SERPL-MCNC: 104 MG/DL (ref 65–99)
HCT VFR BLD AUTO: 45.1 % (ref 36.5–49.3)
HDLC SERPL-MCNC: 52 MG/DL
HGB BLD-MCNC: 14.9 G/DL (ref 12–17)
IMM GRANULOCYTES # BLD AUTO: 0.03 THOUSAND/UL (ref 0–0.2)
IMM GRANULOCYTES NFR BLD AUTO: 1 % (ref 0–2)
LDLC SERPL CALC-MCNC: 138 MG/DL (ref 0–100)
LYMPHOCYTES # BLD AUTO: 1.82 THOUSANDS/ÂΜL (ref 0.6–4.47)
LYMPHOCYTES NFR BLD AUTO: 41 % (ref 14–44)
MCH RBC QN AUTO: 30.3 PG (ref 26.8–34.3)
MCHC RBC AUTO-ENTMCNC: 33 G/DL (ref 31.4–37.4)
MCV RBC AUTO: 92 FL (ref 82–98)
MONOCYTES # BLD AUTO: 0.56 THOUSAND/ÂΜL (ref 0.17–1.22)
MONOCYTES NFR BLD AUTO: 13 % (ref 4–12)
NEUTROPHILS # BLD AUTO: 1.83 THOUSANDS/ÂΜL (ref 1.85–7.62)
NEUTS SEG NFR BLD AUTO: 40 % (ref 43–75)
NRBC BLD AUTO-RTO: 0 /100 WBCS
PLATELET # BLD AUTO: 189 THOUSANDS/UL (ref 149–390)
PMV BLD AUTO: 13.1 FL (ref 8.9–12.7)
POTASSIUM SERPL-SCNC: 4.5 MMOL/L (ref 3.5–5.3)
PROT SERPL-MCNC: 6.9 G/DL (ref 6.4–8.4)
RBC # BLD AUTO: 4.92 MILLION/UL (ref 3.88–5.62)
SODIUM SERPL-SCNC: 139 MMOL/L (ref 135–147)
TRIGL SERPL-MCNC: 170 MG/DL
TSH SERPL DL<=0.05 MIU/L-ACNC: 1.28 UIU/ML (ref 0.45–4.5)
WBC # BLD AUTO: 4.48 THOUSAND/UL (ref 4.31–10.16)

## 2023-07-14 PROCEDURE — 85025 COMPLETE CBC W/AUTO DIFF WBC: CPT

## 2023-07-14 PROCEDURE — 80061 LIPID PANEL: CPT

## 2023-07-14 PROCEDURE — 84443 ASSAY THYROID STIM HORMONE: CPT

## 2023-07-14 PROCEDURE — 80053 COMPREHEN METABOLIC PANEL: CPT

## 2023-07-14 PROCEDURE — 36415 COLL VENOUS BLD VENIPUNCTURE: CPT

## 2023-07-27 ENCOUNTER — RA CDI HCC (OUTPATIENT)
Dept: OTHER | Facility: HOSPITAL | Age: 48
End: 2023-07-27

## 2023-07-27 NOTE — PROGRESS NOTES
[address] [facility] coding opportunities       Chart reviewed, no opportunity found: CHART REVIEWED, NO OPPORTUNITY FOUND        Patients Insurance        Commercial Insurance: [address]

## 2023-08-03 ENCOUNTER — OFFICE VISIT (OUTPATIENT)
Dept: FAMILY MEDICINE CLINIC | Facility: CLINIC | Age: 48
End: 2023-08-03
Payer: COMMERCIAL

## 2023-08-03 VITALS
DIASTOLIC BLOOD PRESSURE: 90 MMHG | HEIGHT: 69 IN | BODY MASS INDEX: 28.29 KG/M2 | SYSTOLIC BLOOD PRESSURE: 130 MMHG | OXYGEN SATURATION: 97 % | TEMPERATURE: 97.6 F | HEART RATE: 62 BPM | RESPIRATION RATE: 12 BRPM | WEIGHT: 191 LBS

## 2023-08-03 DIAGNOSIS — G47.33 OSA (OBSTRUCTIVE SLEEP APNEA): ICD-10-CM

## 2023-08-03 DIAGNOSIS — H61.21 IMPACTED CERUMEN OF RIGHT EAR: ICD-10-CM

## 2023-08-03 DIAGNOSIS — E78.5 ELEVATED LIPIDS: ICD-10-CM

## 2023-08-03 DIAGNOSIS — Z12.11 SCREENING FOR COLON CANCER: ICD-10-CM

## 2023-08-03 DIAGNOSIS — R73.01 IFG (IMPAIRED FASTING GLUCOSE): ICD-10-CM

## 2023-08-03 DIAGNOSIS — Z00.00 ANNUAL PHYSICAL EXAM: Primary | ICD-10-CM

## 2023-08-03 PROBLEM — U07.1 COVID-19: Status: RESOLVED | Noted: 2023-06-29 | Resolved: 2023-08-03

## 2023-08-03 PROBLEM — R00.2 PALPITATIONS: Status: RESOLVED | Noted: 2022-05-10 | Resolved: 2023-08-03

## 2023-08-03 PROBLEM — B07.9 VIRAL WARTS: Status: RESOLVED | Noted: 2022-05-10 | Resolved: 2023-08-03

## 2023-08-03 PROCEDURE — 99396 PREV VISIT EST AGE 40-64: CPT | Performed by: NURSE PRACTITIONER

## 2023-08-03 PROCEDURE — 69210 REMOVE IMPACTED EAR WAX UNI: CPT | Performed by: NURSE PRACTITIONER

## 2023-08-03 PROCEDURE — 99214 OFFICE O/P EST MOD 30 MIN: CPT | Performed by: NURSE PRACTITIONER

## 2023-08-03 NOTE — ASSESSMENT & PLAN NOTE
Glucose mildly elevated on recent set of labs. Counseled on the importance of consuming a low carb diet and engaging in daily physical activity.

## 2023-08-03 NOTE — ASSESSMENT & PLAN NOTE
Lipids elevated on recent lipid panel. Counseled on the importance of consuming a heart healthy diet and engaging in daily physical activity.

## 2023-08-03 NOTE — PROGRESS NOTES
[address]    NAME: Rosy Cristina  AGE: 56 y.o. SEX: male  : 1975     DATE: 8/3/2023     Assessment and Plan:     Problem List Items Addressed This Visit        Endocrine    IFG (impaired fasting glucose)     Glucose mildly elevated on recent set of labs. Counseled on the importance of consuming a low carb diet and engaging in daily physical activity. Respiratory    JOYCELYN (obstructive sleep apnea)     Continue CPAP use nightly. Other    Annual physical exam - Primary    Elevated lipids     Lipids elevated on recent lipid panel. Counseled on the importance of consuming a heart healthy diet and engaging in daily physical activity. Other Visit Diagnoses     Screening for colon cancer        Relevant Orders    Ambulatory referral to Gastroenterology    Impacted cerumen of right ear        Relevant Orders    Ear cerumen removal (Completed)          Immunizations and preventive care screenings were discussed with patient today. Appropriate education was printed on patient's after visit summary. Discussed risks and benefits of prostate cancer screening. We discussed the controversial history of PSA screening for prostate cancer in the [facility] as well as the risk of over detection and over treatment of prostate cancer by way of PSA screening. The patient understands that PSA blood testing is an imperfect way to screen for prostate cancer and that elevated PSA levels in the blood may also be caused by infection, inflammation, prostatic trauma or manipulation, urological procedures, or by benign prostatic enlargement. The role of the digital rectal examination in prostate cancer screening was also discussed and I discussed with him that there is large interobserver variability in the findings of digital rectal examination.     Counseling:  Alcohol/drug use: discussed moderation in alcohol intake, the recommendations for healthy alcohol use, and avoidance of illicit drug use. Dental Health: discussed importance of regular tooth brushing, flossing, and dental visits. Injury prevention: discussed safety/seat belts, safety helmets, smoke detectors, carbon dioxide detectors, and smoking near bedding or upholstery. Sexual health: discussed sexually transmitted diseases, partner selection, use of condoms, avoidance of unintended pregnancy, and contraceptive alternatives. · Exercise: the importance of regular exercise/physical activity was discussed. Recommend exercise 3-5 times per week for at least 30 minutes. Return in about 1 year (around 8/3/2024), or if symptoms worsen or fail to improve, for Annual physical.     Chief Complaint:     Chief Complaint   Patient presents with   • Physical Exam     Due for colo screening      History of Present Illness:     Adult Annual Physical   Patient here for a comprehensive physical exam. The patient reports no problems. Diet and Physical Activity  · Diet/Nutrition: poor diet. · Exercise: no formal exercise. Depression Screening  PHQ-2/9 Depression Screening    Little interest or pleasure in doing things: 0 - not at all  Feeling down, depressed, or hopeless: 0 - not at all  PHQ-2 Score: 0  PHQ-2 Interpretation: Negative depression screen       General Health  · Sleep: sleeps well and occasionally will sleep poorly due to CPAP machine . · Hearing: decreased - bilateral.  · Vision: most recent eye exam >1 year ago. · Dental: no dental visits for >1 year.  Health  · Symptoms include: none     Review of Systems:     Review of Systems   Constitutional: Negative. HENT: Positive for hearing loss. Eyes: Negative. Respiratory: Negative. Cardiovascular: Negative. Gastrointestinal: Negative. Endocrine: Negative. Genitourinary: Negative. Musculoskeletal: Negative. Skin: Negative. Allergic/Immunologic: Positive for environmental allergies. Neurological: Negative. Hematological: Negative. Psychiatric/Behavioral: Negative. Past Medical History:     Past Medical History:   Diagnosis Date   • Depression    • JOYCELYN (obstructive sleep apnea)       Past Surgical History:     Past Surgical History:   Procedure Laterality Date   • WISDOM TOOTH EXTRACTION        Family History:     Family History   Problem Relation Age of Onset   • Cancer Mother    • Colon cancer Mother    • Heart disease Father       Social History:     Social History     Socioeconomic History   • Marital status: /Civil Union     Spouse name: None   • Number of children: None   • Years of education: None   • Highest education level: None   Occupational History   • None   Tobacco Use   • Smoking status: Never   • Smokeless tobacco: Never   Vaping Use   • Vaping Use: Never used   Substance and Sexual Activity   • Alcohol use: Yes   • Drug use: Never   • Sexual activity: None   Other Topics Concern   • None   Social History Narrative   • None     Social Determinants of Health     Financial Resource Strain: Not on file   Food Insecurity: Not on file   Transportation Needs: Not on file   Physical Activity: Not on file   Stress: Not on file   Social Connections: Not on file   Intimate Partner Violence: Not on file   Housing Stability: Not on file      Current Medications:     Current Outpatient Medications   Medication Sig Dispense Refill   • EPINEPHrine (EPIPEN) 0.3 mg/0.3 mL SOAJ Inject 0.3 mL (0.3 mg total) into a muscle once for 1 dose 0.6 mL 0     No current facility-administered medications for this visit. Allergies:      Allergies   Allergen Reactions   • Bee Pollen Anaphylaxis   • Penicillins Hives and Itching      Physical Exam:     /90   Pulse 62   Temp 97.6 °F (36.4 °C)   Resp 12   Ht 5' 9" (1.753 m)   Wt 86.6 kg (191 lb)   SpO2 97%   BMI 28.21 kg/m²     Physical Exam  Vitals and nursing note reviewed. Constitutional:       General: He is not in acute distress. Appearance: Normal appearance. He is well-developed. He is not ill-appearing, toxic-appearing or diaphoretic. HENT:      Head: Normocephalic and atraumatic. Right Ear: External ear normal. There is impacted cerumen. Left Ear: Tympanic membrane and external ear normal.      Nose: Nose normal.      Mouth/Throat:      Mouth: Mucous membranes are moist.      Pharynx: Oropharynx is clear. No oropharyngeal exudate. Eyes:      General: Lids are normal.      Conjunctiva/sclera: Conjunctivae normal.      Pupils: Pupils are equal, round, and reactive to light. Cardiovascular:      Rate and Rhythm: Normal rate and regular rhythm. Heart sounds: Normal heart sounds. No murmur heard. Pulmonary:      Effort: Pulmonary effort is normal. No respiratory distress. Breath sounds: Normal breath sounds. No stridor. No wheezing or rales. Chest:      Chest wall: No tenderness. Abdominal:      General: Bowel sounds are normal. There is no distension. Palpations: Abdomen is soft. There is no mass. Tenderness: There is no abdominal tenderness. There is no guarding or rebound. Hernia: No hernia is present. Comments: Waist circumference=39.5"   Musculoskeletal:         General: No deformity. Normal range of motion. Cervical back: Normal range of motion and neck supple. Lymphadenopathy:      Cervical: No cervical adenopathy. Skin:     General: Skin is warm and dry. Capillary Refill: Capillary refill takes less than 2 seconds. Neurological:      General: No focal deficit present. Mental Status: He is alert and oriented to person, place, and time. Psychiatric:         Mood and Affect: Mood normal.         Behavior: Behavior normal.         Thought Content:  Thought content normal.         Judgment: Judgment normal.        Results for orders placed or performed in visit on 07/14/23   CBC and differential   Result Value Ref Range    WBC 4.48 4.31 - 10.16 Thousand/uL    RBC 4.92 3.88 - 5.62 Million/uL    Hemoglobin 14.9 12.0 - 17.0 g/dL    Hematocrit 45.1 36.5 - 49.3 %    MCV 92 82 - 98 fL    MCH 30.3 26.8 - 34.3 pg    MCHC 33.0 31.4 - 37.4 g/dL    RDW 12.4 11.6 - 15.1 %    MPV 13.1 (H) 8.9 - 12.7 fL    Platelets 717 964 - 778 Thousands/uL    nRBC 0 /100 WBCs    Neutrophils Relative 40 (L) 43 - 75 %    Immat GRANS % 1 0 - 2 %    Lymphocytes Relative 41 14 - 44 %    Monocytes Relative 13 (H) 4 - 12 %    Eosinophils Relative 4 0 - 6 %    Basophils Relative 1 0 - 1 %    Neutrophils Absolute 1.83 (L) 1.85 - 7.62 Thousands/µL    Immature Grans Absolute 0.03 0.00 - 0.20 Thousand/uL    Lymphocytes Absolute 1.82 0.60 - 4.47 Thousands/µL    Monocytes Absolute 0.56 0.17 - 1.22 Thousand/µL    Eosinophils Absolute 0.19 0.00 - 0.61 Thousand/µL    Basophils Absolute 0.05 0.00 - 0.10 Thousands/µL   Comprehensive metabolic panel   Result Value Ref Range    Sodium 139 135 - 147 mmol/L    Potassium 4.5 3.5 - 5.3 mmol/L    Chloride 107 96 - 108 mmol/L    CO2 29 21 - 32 mmol/L    ANION GAP 3 mmol/L    BUN 22 5 - 25 mg/dL    Creatinine 0.97 0.60 - 1.30 mg/dL    Glucose, Fasting 104 (H) 65 - 99 mg/dL    Calcium 9.4 8.4 - 10.2 mg/dL    AST 20 13 - 39 U/L    ALT 23 7 - 52 U/L    Alkaline Phosphatase 47 34 - 104 U/L    Total Protein 6.9 6.4 - 8.4 g/dL    Albumin 4.4 3.5 - 5.0 g/dL    Total Bilirubin 0.62 0.20 - 1.00 mg/dL    eGFR 93 ml/min/1.73sq m   Lipid Panel with Direct LDL reflex   Result Value Ref Range    Cholesterol 224 (H) See Comment mg/dL    Triglycerides 170 (H) See Comment mg/dL    HDL, Direct 52 >=40 mg/dL    LDL Calculated 138 (H) 0 - 100 mg/dL   TSH, 3rd generation with Free T4 reflex   Result Value Ref Range    TSH 3RD GENERATON 1.283 0.450 - 4.500 uIU/mL   Ear cerumen removal    Date/Time: 8/3/2023 9:00 AM    Performed by: MARGO Hutson  Authorized by: MARGO Hutson  Universal Protocol:  Consent: Verbal consent obtained. Risks and benefits: risks, benefits and alternatives were discussed  Consent given by: patient  Time out: Immediately prior to procedure a "time out" was called to verify the correct patient, procedure, equipment, support staff and site/side marked as required. Patient understanding: patient states understanding of the procedure being performed  Patient identity confirmed: verbally with patient      Patient location:  Clinic  Procedure details:     Local anesthetic:  None    Location:  R ear    Procedure type: irrigation with instrumentation      Instrumentation: curette      Approach:  Natural orifice  Post-procedure details:     Complication:  None    Hearing quality:  Improved    Patient tolerance of procedure: Tolerated well, no immediate complications        BMI Counseling: Body mass index is 28.21 kg/m². The BMI is above normal. Nutrition recommendations include decreasing overall calorie intake, 3-5 servings of fruits/vegetables daily, reducing fast food intake, consuming healthier snacks, decreasing soda and/or juice intake, moderation in carbohydrate intake and increasing intake of lean protein. Exercise recommendations include moderate aerobic physical activity for 150 minutes/week.   Reggie Rojas, [address] [facility]

## 2024-07-29 ENCOUNTER — TELEPHONE (OUTPATIENT)
Age: 49
End: 2024-07-29

## 2024-07-29 NOTE — TELEPHONE ENCOUNTER
Patient called stating he has an appt for a physical with Pao Summers 8/16. He left a message to upload lab orders 7/18 but no labs are in his chart. He needs this blood work for his wife's insurance. Patient also asked if Testosterone can be ordered as well. He states he is feeling fatique  Please notify patient when order is uploaded

## 2024-07-30 DIAGNOSIS — Z00.00 HEALTHCARE MAINTENANCE: ICD-10-CM

## 2024-07-30 DIAGNOSIS — R53.83 OTHER FATIGUE: Primary | ICD-10-CM

## 2024-08-02 ENCOUNTER — APPOINTMENT (OUTPATIENT)
Dept: LAB | Facility: HOSPITAL | Age: 49
End: 2024-08-02
Payer: COMMERCIAL

## 2024-08-05 ENCOUNTER — APPOINTMENT (OUTPATIENT)
Dept: LAB | Facility: HOSPITAL | Age: 49
End: 2024-08-05
Payer: COMMERCIAL

## 2024-08-05 DIAGNOSIS — R53.83 OTHER FATIGUE: ICD-10-CM

## 2024-08-05 DIAGNOSIS — Z00.00 HEALTHCARE MAINTENANCE: ICD-10-CM

## 2024-08-05 LAB
ALBUMIN SERPL BCG-MCNC: 4.3 G/DL (ref 3.5–5)
ALP SERPL-CCNC: 38 U/L (ref 34–104)
ALT SERPL W P-5'-P-CCNC: 22 U/L (ref 7–52)
ANION GAP SERPL CALCULATED.3IONS-SCNC: 4 MMOL/L (ref 4–13)
AST SERPL W P-5'-P-CCNC: 21 U/L (ref 13–39)
BASOPHILS # BLD AUTO: 0.07 THOUSANDS/ÂΜL (ref 0–0.1)
BASOPHILS NFR BLD AUTO: 1 % (ref 0–1)
BILIRUB SERPL-MCNC: 0.43 MG/DL (ref 0.2–1)
BUN SERPL-MCNC: 18 MG/DL (ref 5–25)
CALCIUM SERPL-MCNC: 9 MG/DL (ref 8.4–10.2)
CHLORIDE SERPL-SCNC: 105 MMOL/L (ref 96–108)
CHOLEST SERPL-MCNC: 211 MG/DL
CO2 SERPL-SCNC: 29 MMOL/L (ref 21–32)
CREAT SERPL-MCNC: 0.91 MG/DL (ref 0.6–1.3)
EOSINOPHIL # BLD AUTO: 0.19 THOUSAND/ÂΜL (ref 0–0.61)
EOSINOPHIL NFR BLD AUTO: 4 % (ref 0–6)
ERYTHROCYTE [DISTWIDTH] IN BLOOD BY AUTOMATED COUNT: 13.1 % (ref 11.6–15.1)
GFR SERPL CREATININE-BSD FRML MDRD: 100 ML/MIN/1.73SQ M
GLUCOSE P FAST SERPL-MCNC: 99 MG/DL (ref 65–99)
HCT VFR BLD AUTO: 46.5 % (ref 36.5–49.3)
HDLC SERPL-MCNC: 61 MG/DL
HGB BLD-MCNC: 15.4 G/DL (ref 12–17)
IMM GRANULOCYTES # BLD AUTO: 0.02 THOUSAND/UL (ref 0–0.2)
IMM GRANULOCYTES NFR BLD AUTO: 0 % (ref 0–2)
LDLC SERPL CALC-MCNC: 127 MG/DL (ref 0–100)
LYMPHOCYTES # BLD AUTO: 1.76 THOUSANDS/ÂΜL (ref 0.6–4.47)
LYMPHOCYTES NFR BLD AUTO: 32 % (ref 14–44)
MCH RBC QN AUTO: 30.7 PG (ref 26.8–34.3)
MCHC RBC AUTO-ENTMCNC: 33.1 G/DL (ref 31.4–37.4)
MCV RBC AUTO: 93 FL (ref 82–98)
MONOCYTES # BLD AUTO: 0.46 THOUSAND/ÂΜL (ref 0.17–1.22)
MONOCYTES NFR BLD AUTO: 8 % (ref 4–12)
NEUTROPHILS # BLD AUTO: 2.98 THOUSANDS/ÂΜL (ref 1.85–7.62)
NEUTS SEG NFR BLD AUTO: 55 % (ref 43–75)
NONHDLC SERPL-MCNC: 150 MG/DL
NRBC BLD AUTO-RTO: 0 /100 WBCS
PLATELET # BLD AUTO: 168 THOUSANDS/UL (ref 149–390)
PMV BLD AUTO: 13.4 FL (ref 8.9–12.7)
POTASSIUM SERPL-SCNC: 4 MMOL/L (ref 3.5–5.3)
PROT SERPL-MCNC: 6.5 G/DL (ref 6.4–8.4)
RBC # BLD AUTO: 5.01 MILLION/UL (ref 3.88–5.62)
SODIUM SERPL-SCNC: 138 MMOL/L (ref 135–147)
TRIGL SERPL-MCNC: 115 MG/DL
TSH SERPL DL<=0.05 MIU/L-ACNC: 1.41 UIU/ML (ref 0.45–4.5)
WBC # BLD AUTO: 5.48 THOUSAND/UL (ref 4.31–10.16)

## 2024-08-05 PROCEDURE — 80061 LIPID PANEL: CPT

## 2024-08-05 PROCEDURE — 36415 COLL VENOUS BLD VENIPUNCTURE: CPT

## 2024-08-05 PROCEDURE — 84402 ASSAY OF FREE TESTOSTERONE: CPT

## 2024-08-05 PROCEDURE — 84443 ASSAY THYROID STIM HORMONE: CPT

## 2024-08-05 PROCEDURE — 84403 ASSAY OF TOTAL TESTOSTERONE: CPT

## 2024-08-05 PROCEDURE — 80053 COMPREHEN METABOLIC PANEL: CPT

## 2024-08-05 PROCEDURE — 85025 COMPLETE CBC W/AUTO DIFF WBC: CPT

## 2024-08-16 ENCOUNTER — OFFICE VISIT (OUTPATIENT)
Dept: FAMILY MEDICINE CLINIC | Facility: CLINIC | Age: 49
End: 2024-08-16
Payer: COMMERCIAL

## 2024-08-16 ENCOUNTER — TELEPHONE (OUTPATIENT)
Age: 49
End: 2024-08-16

## 2024-08-16 VITALS
WEIGHT: 182 LBS | HEIGHT: 69 IN | SYSTOLIC BLOOD PRESSURE: 130 MMHG | BODY MASS INDEX: 26.96 KG/M2 | DIASTOLIC BLOOD PRESSURE: 80 MMHG

## 2024-08-16 DIAGNOSIS — Z12.11 SCREEN FOR COLON CANCER: ICD-10-CM

## 2024-08-16 DIAGNOSIS — Z12.5 SCREENING PSA (PROSTATE SPECIFIC ANTIGEN): ICD-10-CM

## 2024-08-16 DIAGNOSIS — Z00.00 ANNUAL PHYSICAL EXAM: Primary | ICD-10-CM

## 2024-08-16 DIAGNOSIS — E78.2 MIXED HYPERLIPIDEMIA: ICD-10-CM

## 2024-08-16 DIAGNOSIS — Z80.0 FAMILY HISTORY OF COLON CANCER IN MOTHER: ICD-10-CM

## 2024-08-16 PROBLEM — R73.01 IFG (IMPAIRED FASTING GLUCOSE): Status: RESOLVED | Noted: 2023-08-03 | Resolved: 2024-08-16

## 2024-08-16 PROBLEM — B49 FUNGAL INFECTION: Status: RESOLVED | Noted: 2022-05-10 | Resolved: 2024-08-16

## 2024-08-16 PROBLEM — G89.29 ELBOW PAIN, CHRONIC, RIGHT: Status: RESOLVED | Noted: 2019-10-23 | Resolved: 2024-08-16

## 2024-08-16 PROBLEM — M25.521 ELBOW PAIN, CHRONIC, RIGHT: Status: RESOLVED | Noted: 2019-10-23 | Resolved: 2024-08-16

## 2024-08-16 PROCEDURE — 99396 PREV VISIT EST AGE 40-64: CPT | Performed by: NURSE PRACTITIONER

## 2024-08-16 RX ORDER — ROSUVASTATIN CALCIUM 5 MG/1
5 TABLET, COATED ORAL DAILY
Qty: 100 TABLET | Refills: 3 | Status: SHIPPED | OUTPATIENT
Start: 2024-08-16

## 2024-08-16 NOTE — TELEPHONE ENCOUNTER
Pt called and said Pao Summers mentioned a replacement for the Flonase for him since that was not working.  He forgot the name and if it was over the counter or if Pao would call a script in for him.  He would like a call back with the replacement name.

## 2024-08-16 NOTE — ASSESSMENT & PLAN NOTE
Discussed all with patient.  Due to father's history of MI at an early age, I would recommend starting on rosuvastatin daily.  He will have blood work done in 6 months and we will call him with results.  Any issues with the medication, he will call us sooner.

## 2024-08-16 NOTE — PROGRESS NOTES
Adult Annual Physical  Name: Satya Summers      : 1975      MRN: [id number]  Encounter Provider: MRAGO [place]  Encounter Date: 2024   Encounter department: [facility] [address] [facility]    Assessment & Plan   1. Annual physical exam  2. Mixed hyperlipidemia  Assessment & Plan:  Discussed all with patient.  Due to father's history of MI at an early age, I would recommend starting on rosuvastatin daily.  He will have blood work done in 6 months and we will call him with results.  Any issues with the medication, he will call us sooner.  Orders:  -     rosuvastatin (CRESTOR) 5 mg tablet; Take 1 tablet (5 mg total) by mouth daily  -     Lipid panel; Future  -     Comprehensive metabolic panel; Future  3. Screening PSA (prostate specific antigen)  -     PSA, Total Screen; Future  4. Family history of colon cancer in mother  Assessment & Plan:  Encouraged to schedule colonoscopy.    Orders:  -     Ambulatory Referral to Gastroenterology; Future  5. Screen for colon cancer  -     Ambulatory Referral to Gastroenterology; Future    Immunizations and preventive care screenings were discussed with patient today. Appropriate education was printed on patient's after visit summary.    Discussed risks and benefits of prostate cancer screening. We discussed the controversial history of PSA screening for prostate cancer in the [place] as well as the risk of over detection and over treatment of prostate cancer by way of PSA screening.  The patient understands that PSA blood testing is an imperfect way to screen for prostate cancer and that elevated PSA levels in the blood may also be caused by infection, inflammation, prostatic trauma or manipulation, urological procedures, or by benign prostatic enlargement.    The role of the digital rectal examination in prostate cancer screening was also discussed and I discussed with him that there is large interobserver variability in the  "findings of digital rectal examination.    Counseling:  Exercise: the importance of regular exercise/physical activity was discussed. Recommend exercise 3-5 times per week for at least 30 minutes.          History of Present Illness     Adult Annual Physical:  Patient presents for annual physical. Patient presents for annual physical.  He does need a form filled out for insurance..     Diet and Physical Activity:  - Diet/Nutrition: well balanced diet.  - Exercise: 3-4 times a week on average.    Depression Screening:  - PHQ-2 Score: 0    General Health:  - Sleep: sleeps well.  - Hearing: decreased hearing right ear.  - Vision: no vision problems.  - Dental: regular dental visits.    /GYN Health:    - History of STDs: no     Health:  - History of STDs: no.     Review of Systems   Constitutional:  Negative for chills and fever.   HENT:  Negative for ear pain and sore throat.    Eyes:  Negative for pain and visual disturbance.   Respiratory:  Negative for cough and shortness of breath.    Cardiovascular:  Negative for chest pain and palpitations.   Gastrointestinal:  Negative for abdominal pain and vomiting.   Genitourinary:  Negative for dysuria and hematuria.   Musculoskeletal:  Negative for arthralgias and back pain.   Skin:  Negative for color change and rash.   Neurological:  Negative for dizziness, seizures, syncope, light-headedness and headaches.   Psychiatric/Behavioral:  Negative for dysphoric mood and sleep disturbance. The patient is not nervous/anxious.    All other systems reviewed and are negative.    Current Outpatient Medications on File Prior to Visit   Medication Sig Dispense Refill    EPINEPHrine (EPIPEN) 0.3 mg/0.3 mL SOAJ Inject 0.3 mL (0.3 mg total) into a muscle once for 1 dose 0.6 mL 0     No current facility-administered medications on file prior to visit.        Objective     /80   Ht 5' 9\" (1.753 m)   Wt 82.6 kg (182 lb)   BMI 26.88 kg/m²     Physical Exam  Vitals and nursing " note reviewed.   Constitutional:       General: He is not in acute distress.     Appearance: He is well-developed.   HENT:      Head: Normocephalic and atraumatic.      Right Ear: Tympanic membrane normal.      Left Ear: Tympanic membrane normal.      Nose: No congestion.   Eyes:      Conjunctiva/sclera: Conjunctivae normal.   Cardiovascular:      Rate and Rhythm: Normal rate and regular rhythm.      Heart sounds: No murmur heard.  Pulmonary:      Effort: Pulmonary effort is normal. No respiratory distress.      Breath sounds: Normal breath sounds.   Abdominal:      Palpations: Abdomen is soft.      Tenderness: There is no abdominal tenderness.   Musculoskeletal:         General: No swelling.      Cervical back: Neck supple.   Skin:     General: Skin is warm and dry.      Capillary Refill: Capillary refill takes less than 2 seconds.   Neurological:      Mental Status: He is alert.   Psychiatric:         Mood and Affect: Mood normal.       Administrative Statements   I have spent a total time of 30 minutes in caring for this patient on the day of the visit/encounter including Documenting in the medical record, Reviewing / ordering tests, medicine, procedures  , and Obtaining or reviewing history  .

## 2024-08-19 DIAGNOSIS — R09.82 PND (POST-NASAL DRIP): Primary | ICD-10-CM

## 2024-08-19 RX ORDER — AZELASTINE 1 MG/ML
1 SPRAY, METERED NASAL 2 TIMES DAILY
Qty: 1 ML | Refills: 1 | Status: SHIPPED | OUTPATIENT
Start: 2024-08-19

## 2024-09-10 ENCOUNTER — APPOINTMENT (OUTPATIENT)
Dept: LAB | Facility: HOSPITAL | Age: 49
End: 2024-09-10
Payer: COMMERCIAL

## 2024-09-10 PROCEDURE — 84402 ASSAY OF FREE TESTOSTERONE: CPT

## 2024-09-10 PROCEDURE — 84403 ASSAY OF TOTAL TESTOSTERONE: CPT

## 2024-09-11 LAB
TESTOST FREE SERPL-MCNC: 6 PG/ML (ref 6.8–21.5)
TESTOST SERPL-MCNC: 393 NG/DL (ref 264–916)

## 2024-09-12 DIAGNOSIS — R79.89 LOW TESTOSTERONE IN MALE: Primary | ICD-10-CM

## 2024-09-20 ENCOUNTER — TELEPHONE (OUTPATIENT)
Dept: FAMILY MEDICINE CLINIC | Facility: CLINIC | Age: 49
End: 2024-09-20

## 2024-09-20 NOTE — TELEPHONE ENCOUNTER
Called patient and left message to call back that he is due for a colonoscopy or he can choose to do a cologuard

## 2024-09-21 DIAGNOSIS — R09.82 PND (POST-NASAL DRIP): ICD-10-CM

## 2024-09-23 RX ORDER — AZELASTINE HYDROCHLORIDE 137 UG/1
SPRAY, METERED NASAL
Qty: 90 ML | Refills: 1 | Status: SHIPPED | OUTPATIENT
Start: 2024-09-23

## 2024-10-17 ENCOUNTER — TELEPHONE (OUTPATIENT)
Age: 49
End: 2024-10-17

## 2024-10-17 NOTE — TELEPHONE ENCOUNTER
Called and spoke with patient to see if I could help, he just wanted to talk to you about starting an anti depressant. His therapist had mentioned it and possibly lexapro or celexa. I made an apt with patient for Monday with you to discuss as there were no apt's today with you. He said Monday is fine as it wasn't an emergency

## 2024-10-17 NOTE — TELEPHONE ENCOUNTER
Patient called, he wanted to know if his provider could give him a call.  He would like to talk to her about anti-depressants.      Patient would like a call back, please advise.

## 2024-10-21 ENCOUNTER — OFFICE VISIT (OUTPATIENT)
Dept: FAMILY MEDICINE CLINIC | Facility: CLINIC | Age: 49
End: 2024-10-21
Payer: COMMERCIAL

## 2024-10-21 VITALS
WEIGHT: 182.8 LBS | HEART RATE: 90 BPM | BODY MASS INDEX: 27.08 KG/M2 | SYSTOLIC BLOOD PRESSURE: 162 MMHG | RESPIRATION RATE: 18 BRPM | DIASTOLIC BLOOD PRESSURE: 90 MMHG | OXYGEN SATURATION: 98 % | HEIGHT: 69 IN

## 2024-10-21 DIAGNOSIS — Z12.11 SCREENING FOR COLON CANCER: ICD-10-CM

## 2024-10-21 DIAGNOSIS — Z80.0 FAMILY HISTORY OF COLON CANCER IN MOTHER: ICD-10-CM

## 2024-10-21 DIAGNOSIS — F32.0 MILD MAJOR DEPRESSION, SINGLE EPISODE (HCC): Primary | ICD-10-CM

## 2024-10-21 PROCEDURE — 99214 OFFICE O/P EST MOD 30 MIN: CPT | Performed by: NURSE PRACTITIONER

## 2024-10-21 RX ORDER — ESCITALOPRAM OXALATE 10 MG/1
10 TABLET ORAL DAILY
Qty: 30 TABLET | Refills: 1 | Status: SHIPPED | OUTPATIENT
Start: 2024-10-21

## 2024-10-21 NOTE — ASSESSMENT & PLAN NOTE
Depression Screening Follow-up Plan: Patient's depression screening was positive with a PHQ-2 score of 3. Their PHQ-9 score was 8. Patient assessed for underlying major depression. They have no active suicidal ideations. Brief counseling provided and recommend additional follow-up/re-evaluation next office visit. Continue regular follow-up with their psychologist/therapist/psychiatrist who is managing their mental health condition(s).  To begin Lexapro 10 milligrams daily.  To continue counseling weekly.  Follow-up in 4 weeks or sooner if needed.  Orders:    escitalopram (Lexapro) 10 mg tablet; Take 1 tablet (10 mg total) by mouth daily

## 2024-10-21 NOTE — PROGRESS NOTES
Ambulatory Visit  Name: Satya Summers      : 1975      MRN: [id number]  Encounter Provider: MARGO Simmons  Encounter Date: 10/21/2024   Encounter department: [address]    Assessment & Plan  Mild major depression, single episode (HCC)  Depression Screening Follow-up Plan: Patient's depression screening was positive with a PHQ-2 score of 3. Their PHQ-9 score was 8. Patient assessed for underlying major depression. They have no active suicidal ideations. Brief counseling provided and recommend additional follow-up/re-evaluation next office visit. Continue regular follow-up with their psychologist/therapist/psychiatrist who is managing their mental health condition(s).  To begin Lexapro 10 milligrams daily.  To continue counseling weekly.  Follow-up in 4 weeks or sooner if needed.  Orders:    escitalopram (Lexapro) 10 mg tablet; Take 1 tablet (10 mg total) by mouth daily    Screening for colon cancer    Orders:    Ambulatory referral to Gastroenterology; Future    Family history of colon cancer in mother  To proceed with colonoscopy, stressed importance.  Orders:    Ambulatory referral to Gastroenterology; Future       History of Present Illness     Satya presents reporting depression symptoms.  These have been occurring mildly on and off for the last several years.  About 10 years ago he was treated with sertraline but stopped as he felt it interrupted his sleep.  He has been seeing a counselor weekly for the past couple months.  Denies SI/HI.  He has noted symptoms of feeling down, occasional agitation and anxiety, and difficulty concentrating.        History obtained from : patient  Review of Systems   Constitutional: Negative.    HENT: Negative.     Eyes: Negative.    Respiratory: Negative.     Cardiovascular: Negative.    Gastrointestinal: Negative.    Endocrine: Negative.    Genitourinary: Negative.    Musculoskeletal: Negative.    Skin: Negative.   "  Allergic/Immunologic: Negative.    Neurological: Negative.    Hematological: Negative.    Psychiatric/Behavioral:  Positive for decreased concentration and dysphoric mood. Negative for self-injury and suicidal ideas. The patient is nervous/anxious.      Current Outpatient Medications on File Prior to Visit   Medication Sig Dispense Refill    Azelastine HCl 137 MCG/SPRAY SOLN 1 SPRAY INTO EACH NOSTRIL 2 TIMES A DAY USE IN EACH NOSTRIL AS DIRECTED 90 mL 1    rosuvastatin (CRESTOR) 5 mg tablet Take 1 tablet (5 mg total) by mouth daily 100 tablet 3    EPINEPHrine (EPIPEN) 0.3 mg/0.3 mL SOAJ Inject 0.3 mL (0.3 mg total) into a muscle once for 1 dose 0.6 mL 0     No current facility-administered medications on file prior to visit.          Objective     /90 (BP Location: Left arm, Patient Position: Sitting)   Pulse 90   Resp 18   Ht 5' 9\" (1.753 m)   Wt 82.9 kg (182 lb 12.8 oz)   SpO2 98%   BMI 26.99 kg/m²     Physical Exam  Vitals and nursing note reviewed.   Constitutional:       General: He is not in acute distress.     Appearance: Normal appearance. He is well-developed. He is not ill-appearing, toxic-appearing or diaphoretic.   HENT:      Head: Normocephalic and atraumatic.   Eyes:      Conjunctiva/sclera: Conjunctivae normal.   Cardiovascular:      Rate and Rhythm: Normal rate and regular rhythm.      Heart sounds: Normal heart sounds. No murmur heard.  Pulmonary:      Effort: Pulmonary effort is normal. No respiratory distress.      Breath sounds: Normal breath sounds. No wheezing or rales.   Chest:      Chest wall: No tenderness.   Musculoskeletal:      Cervical back: Neck supple.   Skin:     General: Skin is warm and dry.      Capillary Refill: Capillary refill takes less than 2 seconds.   Neurological:      General: No focal deficit present.      Mental Status: He is alert and oriented to person, place, and time.   Psychiatric:         Mood and Affect: Mood normal.         Behavior: Behavior " normal.         Thought Content: Thought content normal.         Judgment: Judgment normal.       Administrative Statements   I have spent a total time of 18 minutes in caring for this patient on the day of the visit/encounter including Diagnostic results, Prognosis, Risks and benefits of tx options, Instructions for management, Patient and family education, Importance of tx compliance, Risk factor reductions, Impressions, Counseling / Coordination of care, Documenting in the medical record, Reviewing / ordering tests, medicine, procedures  , and Obtaining or reviewing history  .Depression Screening Follow-up Plan: Patient's depression screening was positive with a PHQ-2 score of 3. Their PHQ-9 score was 8. Patient assessed for underlying major depression. They have no active suicidal ideations. Brief counseling provided and recommend additional follow-up/re-evaluation next office visit. Continue regular follow-up with their psychologist/therapist/psychiatrist who is managing their mental health condition(s).

## 2024-10-21 NOTE — ASSESSMENT & PLAN NOTE
To proceed with colonoscopy, stressed importance.  Orders:    Ambulatory referral to Gastroenterology; Future

## 2024-11-14 ENCOUNTER — RA CDI HCC (OUTPATIENT)
Dept: OTHER | Facility: HOSPITAL | Age: 49
End: 2024-11-14

## 2024-11-14 NOTE — PROGRESS NOTES
HCC coding opportunities       Chart reviewed, no opportunity found: CHART REVIEWED, NO OPPORTUNITY FOUND        Patients Insurance        Commercial Insurance: [employer]

## 2024-12-05 ENCOUNTER — OFFICE VISIT (OUTPATIENT)
Dept: FAMILY MEDICINE CLINIC | Facility: CLINIC | Age: 49
End: 2024-12-05
Payer: COMMERCIAL

## 2024-12-05 VITALS
BODY MASS INDEX: 27.4 KG/M2 | SYSTOLIC BLOOD PRESSURE: 126 MMHG | HEART RATE: 58 BPM | WEIGHT: 185 LBS | DIASTOLIC BLOOD PRESSURE: 78 MMHG | OXYGEN SATURATION: 98 % | HEIGHT: 69 IN

## 2024-12-05 DIAGNOSIS — K58.0 IRRITABLE BOWEL SYNDROME WITH DIARRHEA: ICD-10-CM

## 2024-12-05 DIAGNOSIS — F32.0 MILD MAJOR DEPRESSION, SINGLE EPISODE (HCC): Primary | ICD-10-CM

## 2024-12-05 PROBLEM — F32.2 SEVERE MAJOR DEPRESSIVE DISORDER (HCC): Status: ACTIVE | Noted: 2024-12-05

## 2024-12-05 PROCEDURE — 99214 OFFICE O/P EST MOD 30 MIN: CPT | Performed by: NURSE PRACTITIONER

## 2024-12-05 RX ORDER — HYOSCYAMINE SULFATE 0.125 MG
0.12 TABLET ORAL EVERY 4 HOURS PRN
Qty: 30 TABLET | Refills: 0 | Status: SHIPPED | OUTPATIENT
Start: 2024-12-05

## 2024-12-05 RX ORDER — ESCITALOPRAM OXALATE 10 MG/1
15 TABLET ORAL DAILY
Qty: 30 TABLET | Refills: 3 | Status: SHIPPED | OUTPATIENT
Start: 2024-12-05

## 2024-12-05 NOTE — PROGRESS NOTES
Name: Satya Summers      : 1975      MRN: [id number]  Encounter Provider: MARGO Simmons  Encounter Date: 2024   Encounter department: [facility] [address]  :  Assessment & Plan  Mild major depression, single episode (HCC)  Symptoms have improved but patient continues to experience anxiety, poor sleep, and decreased in concentration at times.  Will increase Lexapro to 15 mg daily.  Follow-up in 1 month if symptoms have not improved.  Otherwise, we will follow-up in 4 months.    Orders:    escitalopram (Lexapro) 10 mg tablet; Take 1.5 tablets (15 mg total) by mouth daily    Irritable bowel syndrome with diarrhea  Advised to follow a low FODMAP diet.  Stressed the importance of proceeding with colonoscopy.  Patient had tried dicyclomine in the past with minimal improvement.  Orders:    hyoscyamine (ANASPAZ,LEVSIN) 0.125 MG tablet; Take 1 tablet (0.125 mg total) by mouth every 4 (four) hours as needed for cramping           History of Present Illness     Jacky presents for a follow up. He was started on lexapro x 4 weeks ago. His symptoms have improved somewhat.       Review of Systems   Constitutional: Negative.    HENT: Negative.     Eyes: Negative.    Respiratory: Negative.     Cardiovascular: Negative.    Gastrointestinal:  Positive for diarrhea. Negative for anal bleeding, blood in stool, constipation, nausea and vomiting.   Endocrine: Negative.    Genitourinary: Negative.    Musculoskeletal: Negative.    Skin: Negative.    Allergic/Immunologic: Negative.    Neurological: Negative.    Hematological: Negative.    Psychiatric/Behavioral:  Positive for decreased concentration and sleep disturbance. The patient is nervous/anxious.      Current Outpatient Medications on File Prior to Visit   Medication Sig Dispense Refill    Azelastine HCl 137 MCG/SPRAY SOLN 1 SPRAY INTO EACH NOSTRIL 2 TIMES A DAY USE IN EACH NOSTRIL AS DIRECTED 90 mL 1    EPINEPHrine (EPIPEN) 0.3  "mg/0.3 mL SOAJ Inject 0.3 mL (0.3 mg total) into a muscle once for 1 dose 0.6 mL 0    rosuvastatin (CRESTOR) 5 mg tablet Take 1 tablet (5 mg total) by mouth daily 100 tablet 3    [DISCONTINUED] escitalopram (Lexapro) 10 mg tablet Take 1 tablet (10 mg total) by mouth daily 30 tablet 1     No current facility-administered medications on file prior to visit.         Objective   /78   Pulse 58   Ht 5' 9\" (1.753 m)   Wt 83.9 kg (185 lb)   SpO2 98%   BMI 27.32 kg/m²      Physical Exam  Vitals and nursing note reviewed.   Constitutional:       General: He is not in acute distress.     Appearance: Normal appearance. He is well-developed. He is not ill-appearing, toxic-appearing or diaphoretic.   HENT:      Head: Normocephalic and atraumatic.   Eyes:      Conjunctiva/sclera: Conjunctivae normal.   Cardiovascular:      Rate and Rhythm: Normal rate and regular rhythm.      Heart sounds: Normal heart sounds. No murmur heard.  Pulmonary:      Effort: Pulmonary effort is normal. No respiratory distress.      Breath sounds: Normal breath sounds. No wheezing or rales.   Chest:      Chest wall: No tenderness.   Abdominal:      General: Bowel sounds are normal. There is no distension.      Palpations: Abdomen is soft. There is no mass.      Tenderness: There is no abdominal tenderness. There is no guarding or rebound.      Hernia: No hernia is present.   Musculoskeletal:      Cervical back: Neck supple.   Skin:     General: Skin is warm and dry.      Capillary Refill: Capillary refill takes less than 2 seconds.   Neurological:      General: No focal deficit present.      Mental Status: He is alert and oriented to person, place, and time.   Psychiatric:         Mood and Affect: Mood normal.         Behavior: Behavior normal.         Thought Content: Thought content normal.         Judgment: Judgment normal.         "

## 2024-12-05 NOTE — ASSESSMENT & PLAN NOTE
Symptoms have improved but patient continues to experience anxiety, poor sleep, and decreased in concentration at times.  Will increase Lexapro to 15 mg daily.  Follow-up in 1 month if symptoms have not improved.  Otherwise, we will follow-up in 4 months.    Orders:    escitalopram (Lexapro) 10 mg tablet; Take 1.5 tablets (15 mg total) by mouth daily

## 2024-12-26 DIAGNOSIS — E78.2 MIXED HYPERLIPIDEMIA: ICD-10-CM

## 2024-12-26 DIAGNOSIS — F32.0 MILD MAJOR DEPRESSION, SINGLE EPISODE (HCC): ICD-10-CM

## 2024-12-27 RX ORDER — ROSUVASTATIN CALCIUM 5 MG/1
5 TABLET, COATED ORAL DAILY
Qty: 100 TABLET | Refills: 1 | Status: SHIPPED | OUTPATIENT
Start: 2024-12-27

## 2024-12-27 RX ORDER — ESCITALOPRAM OXALATE 10 MG/1
15 TABLET ORAL DAILY
Qty: 135 TABLET | Refills: 0 | Status: SHIPPED | OUTPATIENT
Start: 2024-12-27

## 2024-12-31 ENCOUNTER — TELEPHONE (OUTPATIENT)
Age: 49
End: 2024-12-31

## 2024-12-31 NOTE — TELEPHONE ENCOUNTER
Patient is requesting to have his moles removed from his chin and eye lid and is requesting a referral to have that done   Please advise patient when done so he can make an appt   Thank you

## 2025-01-02 NOTE — TELEPHONE ENCOUNTER
I would not be comfortable removing a mole from patient's eyelid.  I would recommend he be seen by either plastic surgery or dermatology.  He can probably get in sooner at plastic surgery.  Please let me know what he would prefer.

## 2025-01-03 NOTE — TELEPHONE ENCOUNTER
Patient called back.  Relayed message to him.  He would like a referral to [facility] and a call back once referral is placed to provide him with the phone number to schedule.  Thank you!

## 2025-01-06 DIAGNOSIS — D22.9 NEVUS: Primary | ICD-10-CM

## 2025-01-15 ENCOUNTER — TELEPHONE (OUTPATIENT)
Age: 50
End: 2025-01-15

## 2025-01-15 NOTE — TELEPHONE ENCOUNTER
Patient called and wanted to verify his referral.  He wants to verify his referral to ambulatory referral to pediatric plastic surgeon.  He wants to make sure this is where he is supposed to go.  I verified with him this is the referral we have in our chart for him.  He wants to verify with provider this is where he is to go.     Please advise and notify.    Thank you.    Jacky- [phone]

## 2025-01-17 ENCOUNTER — TELEPHONE (OUTPATIENT)
Age: 50
End: 2025-01-17

## 2025-01-17 NOTE — TELEPHONE ENCOUNTER
Pt calling with routine referral to derm but calling on plastics phone number requesting an appt with plastics    Pt has several SOC on his body he would like checked, incl a couple on his face he is concerned about. One in his chin that feels like a hard lump, he nicks it when he shaves.    Clarified with pt plastics is not seeing SOC, but I can schedule him with Derm    Advised next avail, he is going to try calling around first, may call us back to schedule NP for full body with derm. Pt is willing to travel to other offices, [place] is closest

## 2025-02-09 DIAGNOSIS — F32.0 MILD MAJOR DEPRESSION, SINGLE EPISODE (HCC): ICD-10-CM

## 2025-02-11 RX ORDER — ESCITALOPRAM OXALATE 10 MG/1
15 TABLET ORAL DAILY
Qty: 90 TABLET | Refills: 1 | Status: SHIPPED | OUTPATIENT
Start: 2025-02-11

## 2025-03-31 ENCOUNTER — RA CDI HCC (OUTPATIENT)
Dept: OTHER | Facility: HOSPITAL | Age: 50
End: 2025-03-31

## 2025-03-31 NOTE — PROGRESS NOTES
HCC coding opportunities       Chart reviewed, no opportunity found: CHART REVIEWED, NO OPPORTUNITY FOUND        Patients Insurance        Commercial Insurance: [employer]

## 2025-04-07 ENCOUNTER — OFFICE VISIT (OUTPATIENT)
Dept: FAMILY MEDICINE CLINIC | Facility: CLINIC | Age: 50
End: 2025-04-07
Payer: COMMERCIAL

## 2025-04-07 VITALS
HEART RATE: 88 BPM | SYSTOLIC BLOOD PRESSURE: 140 MMHG | WEIGHT: 188.6 LBS | OXYGEN SATURATION: 98 % | BODY MASS INDEX: 27.93 KG/M2 | DIASTOLIC BLOOD PRESSURE: 80 MMHG | RESPIRATION RATE: 18 BRPM | HEIGHT: 69 IN

## 2025-04-07 DIAGNOSIS — K58.0 IRRITABLE BOWEL SYNDROME WITH DIARRHEA: ICD-10-CM

## 2025-04-07 DIAGNOSIS — Z12.11 SCREENING FOR MALIGNANT NEOPLASM OF COLON: ICD-10-CM

## 2025-04-07 DIAGNOSIS — F32.0 MILD MAJOR DEPRESSION, SINGLE EPISODE (HCC): Primary | ICD-10-CM

## 2025-04-07 DIAGNOSIS — G47.33 OSA (OBSTRUCTIVE SLEEP APNEA): ICD-10-CM

## 2025-04-07 PROCEDURE — 99214 OFFICE O/P EST MOD 30 MIN: CPT | Performed by: NURSE PRACTITIONER

## 2025-04-07 RX ORDER — HYOSCYAMINE SULFATE 0.12 MG/1
0.12 TABLET ORAL EVERY 4 HOURS PRN
Qty: 30 TABLET | Refills: 0 | Status: SHIPPED | OUTPATIENT
Start: 2025-04-07 | End: 2025-04-07 | Stop reason: SDUPTHER

## 2025-04-07 RX ORDER — HYOSCYAMINE SULFATE 0.12 MG/1
0.12 TABLET ORAL EVERY 4 HOURS PRN
Qty: 30 TABLET | Refills: 0 | Status: SHIPPED | OUTPATIENT
Start: 2025-04-07

## 2025-04-07 RX ORDER — ESCITALOPRAM OXALATE 10 MG/1
10 TABLET ORAL DAILY
Start: 2025-04-07

## 2025-04-07 NOTE — ASSESSMENT & PLAN NOTE
Patient continues to have intermittent diarrhea which improves with healthier dietary choices.  Has tried dicyclomine in the past without much relief.  Advised to try hyoscyamine as needed.    Orders:    hyoscyamine (ANASPAZ,LEVSIN) 0.125 MG tablet; Take 1 tablet (0.125 mg total) by mouth every 4 (four) hours as needed for cramping

## 2025-04-07 NOTE — ASSESSMENT & PLAN NOTE
Well-controlled.  Patient would like to decrease Lexapro from 15 mg a day to 10 mg a day.  Follow-up in 6 months or sooner if needed.    Orders:    escitalopram (LEXAPRO) 10 mg tablet; Take 1 tablet (10 mg total) by mouth daily

## 2025-04-07 NOTE — PROGRESS NOTES
"Name: Satya Summers      : 1975      MRN: [id number]  Encounter Provider: MARGO Simmons  Encounter Date: 2025   Encounter department: [facility] [address]  :  Assessment & Plan  Mild major depression, single episode (HCC)  Well-controlled.  Patient would like to decrease Lexapro from 15 mg a day to 10 mg a day.  Follow-up in 6 months or sooner if needed.    Orders:    escitalopram (LEXAPRO) 10 mg tablet; Take 1 tablet (10 mg total) by mouth daily    JOYCELYN (obstructive sleep apnea)  Stable.  To continue wearing the PAP nightly.       Screening for malignant neoplasm of colon  Provide referral to GI to obtain call colonoscopy.  Orders:    Ambulatory referral to Gastroenterology; Future    Irritable bowel syndrome with diarrhea  Patient continues to have intermittent diarrhea which improves with healthier dietary choices.  Has tried dicyclomine in the past without much relief.  Advised to try hyoscyamine as needed.    Orders:    hyoscyamine (ANASPAZ,LEVSIN) 0.125 MG tablet; Take 1 tablet (0.125 mg total) by mouth every 4 (four) hours as needed for cramping           History of Present Illness   Jacky presents for a follow-up.  Feeling well.  He would like to discuss decreasing his Lexapro.      Review of Systems   Constitutional: Negative.    HENT: Negative.     Eyes: Negative.    Respiratory: Negative.     Cardiovascular: Negative.    Gastrointestinal: Negative.    Endocrine: Negative.    Genitourinary: Negative.    Musculoskeletal: Negative.    Skin: Negative.    Allergic/Immunologic: Negative.    Neurological: Negative.    Hematological: Negative.    Psychiatric/Behavioral:  The patient is nervous/anxious (improved).        Objective   /80 (BP Location: Left arm, Patient Position: Sitting) Comment: verbalized reading to patient  Pulse 88   Resp 18   Ht 5' 9\" (1.753 m)   Wt 85.5 kg (188 lb 9.6 oz)   SpO2 98%   BMI 27.85 kg/m²      Physical Exam  Vitals " and nursing note reviewed.   Constitutional:       General: He is not in acute distress.     Appearance: Normal appearance. He is well-developed. He is not ill-appearing, toxic-appearing or diaphoretic.   HENT:      Head: Normocephalic and atraumatic.   Eyes:      Conjunctiva/sclera: Conjunctivae normal.   Cardiovascular:      Rate and Rhythm: Normal rate and regular rhythm.      Heart sounds: Normal heart sounds. No murmur heard.  Pulmonary:      Effort: Pulmonary effort is normal. No respiratory distress.      Breath sounds: Normal breath sounds. No wheezing or rales.   Chest:      Chest wall: No tenderness.   Abdominal:      General: Bowel sounds are normal. There is no distension.      Palpations: Abdomen is soft. There is no mass.      Tenderness: There is no abdominal tenderness. There is no guarding or rebound.      Hernia: No hernia is present.   Musculoskeletal:      Cervical back: Neck supple.   Skin:     General: Skin is warm and dry.      Capillary Refill: Capillary refill takes less than 2 seconds.   Neurological:      General: No focal deficit present.      Mental Status: He is alert and oriented to person, place, and time.   Psychiatric:         Mood and Affect: Mood normal.         Behavior: Behavior normal.         Thought Content: Thought content normal.         Judgment: Judgment normal.

## 2025-06-03 DIAGNOSIS — F32.0 MILD MAJOR DEPRESSION, SINGLE EPISODE (HCC): ICD-10-CM

## 2025-06-03 RX ORDER — ESCITALOPRAM OXALATE 10 MG/1
15 TABLET ORAL DAILY
Qty: 135 TABLET | Refills: 1 | Status: SHIPPED | OUTPATIENT
Start: 2025-06-03

## 2025-06-12 ENCOUNTER — OFFICE VISIT (OUTPATIENT)
Age: 50
End: 2025-06-12
Payer: COMMERCIAL

## 2025-06-12 VITALS
DIASTOLIC BLOOD PRESSURE: 103 MMHG | HEART RATE: 64 BPM | RESPIRATION RATE: 18 BRPM | WEIGHT: 178.8 LBS | SYSTOLIC BLOOD PRESSURE: 153 MMHG | OXYGEN SATURATION: 97 % | BODY MASS INDEX: 26.4 KG/M2 | TEMPERATURE: 96.8 F

## 2025-06-12 DIAGNOSIS — S05.31XA: Primary | ICD-10-CM

## 2025-06-12 PROCEDURE — S9083 URGENT CARE CENTER GLOBAL: HCPCS | Performed by: PHYSICIAN ASSISTANT

## 2025-06-12 PROCEDURE — G0382 LEV 3 HOSP TYPE B ED VISIT: HCPCS | Performed by: PHYSICIAN ASSISTANT

## 2025-06-12 NOTE — PROGRESS NOTES
[facility]        NAME: Satya Summers is a 49 y.o. male  : 1975    MRN: [id number]  DATE: 2025  TIME: 1:28 PM    Assessment and Plan   Scleral laceration of right eye, initial encounter [S05.31XA]  1. Scleral laceration of right eye, initial encounter  Transfer to other facility          Given the laceration of the sclera unclear if there is a globe perforation or not although I do have lower suspicion for this cannot rule this out here in the urgent care and the patient will go to [facility] in [facility] for evaluation in the ER    Addendum 2:05 PM : I was contacted by emergency department at [facility] to inform me that ophthalmology does not come to the [facility] and as such patient was referred instead to [facility]    The patient and/or parent/legal guardian verbalized understanding of exam findings and   Treatment plan. We engaged in the shared decision-making process and treatment options were   discussed at length with the patient.  All questions, concerns and complaints were answered and   addressed to the patient's' and/or parent/legal guardians's satisfaction.    Patient Instructions   There are no Patient Instructions on file for this visit.    Follow up with PCP in 3-5 days.  Proceed to  ER if symptoms worsen.    If tests are performed, our office will contact you with results only if   changes need to made to the care plan discussed with you at the visit.   You can review your full results on [facility].     Chief Complaint     Chief Complaint   Patient presents with    Eye Injury     Symptoms started Monday, pt states dogs nail hit his eyeball. He complains of redness of the right eye, pt states there was discharge coming from the eye the past 2 days.          History of Present Illness       HPI  Patient reports that being on Monday his dog's nail hit his eyeball he complains of redness to the right eye reports discharge from the right eye for the past 2 days.   No significant change in vision.    Review of Systems   Review of Systems  All other related systems reviewed with patient or accompanying historian and are negative except as noted in HPI    Current Medications     Current Medications[1]    Current Allergies     Allergies as of 06/12/2025 - Reviewed 06/12/2025   Allergen Reaction Noted    Bee venom Anaphylaxis 08/16/2024    Penicillins Hives and Itching 12/04/2017            The following portions of the patient's history were reviewed and updated as appropriate: allergies, current medications, past family history, past medical history, past social history, past surgical history and problem list.     Past Medical History[2]    Past Surgical History[3]    Family History[4]      Medications have been verified.        Objective   BP (!) 153/103   Pulse 64   Temp (!) 96.8 °F (36 °C) (Tympanic)   Resp 18   Wt 81.1 kg (178 lb 12.8 oz)   SpO2 97%   BMI 26.40 kg/m²   No LMP for male patient.       Physical Exam     Physical Exam  Constitutional:       General: He is not in acute distress.     Appearance: He is well-developed.   HENT:      Head: Normocephalic and atraumatic.     Eyes:      General: No scleral icterus.        Right eye: Discharge present.      Extraocular Movements: Extraocular movements intact.      Comments: There is diffuse conjunctival injection in the right lateral inferior aspect of the globe there is a laceration through the conjunctiva slightly under 1 cm with sclera present and protruding through the laceration   Pulmonary:      Effort: Pulmonary effort is normal. No respiratory distress.      Breath sounds: No stridor.     Musculoskeletal:      Cervical back: Normal range of motion and neck supple.     Skin:     General: Skin is warm and dry.     Neurological:      Mental Status: He is alert and oriented to person, place, and time.     Psychiatric:         Behavior: Behavior normal.         Ortho Exam        Procedures  No Procedures  "performed today        Note: Portions of this record may have been created with voice recognition software. Occasional wrong word or \"sound a like\" substitutions may have occurred due to the inherent limitations of voice recognition software. Please read the chart carefully and recognize, using context, where substitutions have occurred.*           [1]   Current Outpatient Medications:     escitalopram (LEXAPRO) 10 mg tablet, TAKE ONE AND ONE-HALF TABLETS DAILY, Disp: 135 tablet, Rfl: 1    rosuvastatin (CRESTOR) 5 mg tablet, Take 1 tablet (5 mg total) by mouth daily, Disp: 100 tablet, Rfl: 1    Azelastine HCl 137 MCG/SPRAY SOLN, 1 SPRAY INTO EACH NOSTRIL 2 TIMES A DAY USE IN EACH NOSTRIL AS DIRECTED (Patient not taking: Reported on 6/12/2025), Disp: 90 mL, Rfl: 1    EPINEPHrine (EPIPEN) 0.3 mg/0.3 mL SOAJ, Inject 0.3 mL (0.3 mg total) into a muscle once for 1 dose, Disp: 0.6 mL, Rfl: 0    hyoscyamine (ANASPAZ,LEVSIN) 0.125 MG tablet, Take 1 tablet (0.125 mg total) by mouth every 4 (four) hours as needed for cramping (Patient not taking: Reported on 6/12/2025), Disp: 30 tablet, Rfl: 0  [2]   Past Medical History:  Diagnosis Date    Depression     JOYCELYN (obstructive sleep apnea)    [3]   Past Surgical History:  Procedure Laterality Date    WISDOM TOOTH EXTRACTION     [4]   Family History  Problem Relation Name Age of Onset    Cancer Mother      Colon cancer Mother      Heart disease Father       "

## 2025-06-14 DIAGNOSIS — E78.2 MIXED HYPERLIPIDEMIA: ICD-10-CM

## 2025-06-15 RX ORDER — ROSUVASTATIN CALCIUM 5 MG/1
5 TABLET, COATED ORAL DAILY
Qty: 90 TABLET | Refills: 1 | Status: SHIPPED | OUTPATIENT
Start: 2025-06-15